# Patient Record
Sex: FEMALE | Race: WHITE | NOT HISPANIC OR LATINO | Employment: OTHER | URBAN - METROPOLITAN AREA
[De-identification: names, ages, dates, MRNs, and addresses within clinical notes are randomized per-mention and may not be internally consistent; named-entity substitution may affect disease eponyms.]

---

## 2017-01-05 DIAGNOSIS — C50.919 MALIGNANT NEOPLASM OF OTHER SPECIFIED SITES OF FEMALE BREAST: Primary | ICD-10-CM

## 2017-01-10 ENCOUNTER — TELEPHONE (OUTPATIENT)
Dept: HEMATOLOGY/ONCOLOGY | Facility: CLINIC | Age: 67
End: 2017-01-10

## 2017-01-10 NOTE — TELEPHONE ENCOUNTER
Received call from patient's . Scheduled labs at [facility] for 1/12/17 and follow up with Dr. Bangura on 1/19/17 at 9:20 am.  voiced understanding and appreciation.

## 2017-01-11 ENCOUNTER — PATIENT MESSAGE (OUTPATIENT)
Dept: NEUROSURGERY | Facility: CLINIC | Age: 67
End: 2017-01-11

## 2017-01-11 ENCOUNTER — HOSPITAL ENCOUNTER (OUTPATIENT)
Dept: RADIOLOGY | Facility: HOSPITAL | Age: 67
Discharge: HOME | End: 2017-01-11
Attending: NURSE PRACTITIONER
Payer: MEDICARE

## 2017-01-11 ENCOUNTER — OFFICE VISIT (OUTPATIENT)
Dept: NEUROSURGERY | Facility: CLINIC | Age: 67
End: 2017-01-11
Payer: MEDICARE

## 2017-01-11 ENCOUNTER — TELEPHONE (OUTPATIENT)
Dept: RADIOLOGY | Facility: HOSPITAL | Age: 67
End: 2017-01-11

## 2017-01-11 VITALS
TEMPERATURE: 98 F | WEIGHT: 219 LBS | DIASTOLIC BLOOD PRESSURE: 95 MMHG | BODY MASS INDEX: 36.49 KG/M2 | HEIGHT: 65 IN | SYSTOLIC BLOOD PRESSURE: 133 MMHG | HEART RATE: 99 BPM

## 2017-01-11 DIAGNOSIS — D49.6 BRAINSTEM TUMOR: ICD-10-CM

## 2017-01-11 DIAGNOSIS — Z98.890 STATUS POST CRANIOTOMY: ICD-10-CM

## 2017-01-11 DIAGNOSIS — C50.011 MALIGNANT NEOPLASM OF NIPPLE OF RIGHT BREAST IN FEMALE: ICD-10-CM

## 2017-01-11 DIAGNOSIS — C79.31 BRAIN METASTASES: Primary | ICD-10-CM

## 2017-01-11 DIAGNOSIS — Z98.890 S/P CRANIOTOMY: ICD-10-CM

## 2017-01-11 DIAGNOSIS — Z98.2 S/P VP SHUNT: ICD-10-CM

## 2017-01-11 PROCEDURE — 70553 MRI BRAIN STEM W/O & W/DYE: CPT | Mod: 26,,, | Performed by: RADIOLOGY

## 2017-01-11 PROCEDURE — 70553 MRI BRAIN STEM W/O & W/DYE: CPT | Mod: TC,PO

## 2017-01-11 PROCEDURE — 25500020 PHARM REV CODE 255: Mod: PO | Performed by: NURSE PRACTITIONER

## 2017-01-11 PROCEDURE — 99024 POSTOP FOLLOW-UP VISIT: CPT | Mod: S$GLB,,, | Performed by: NEUROLOGICAL SURGERY

## 2017-01-11 PROCEDURE — A9585 GADOBUTROL INJECTION: HCPCS | Mod: PO | Performed by: NURSE PRACTITIONER

## 2017-01-11 RX ORDER — GADOBUTROL 604.72 MG/ML
10 INJECTION INTRAVENOUS
Status: COMPLETED | OUTPATIENT
Start: 2017-01-11 | End: 2017-01-11

## 2017-01-11 RX ADMIN — GADOBUTROL 10 ML: 604.72 INJECTION INTRAVENOUS at 02:01

## 2017-01-11 NOTE — PROGRESS NOTES
Neurosurgery Outpatient Follow Up    Patient ID: Rosaura David is a 66 y.o. female.    Chief Complaint   Patient presents with    Post-op Evaluation     S/P  Shunt 11/26/16; S/P Suboccipital Craniectomy Infratentorial Supracerebellar Resection of Brain Stem Tumor 11/29/16 - all at [facility]           Review of Systems   Constitutional: Positive for fatigue. Negative for activity change, appetite change, chills, fever and unexpected weight change.   HENT: Negative for tinnitus, trouble swallowing and voice change.    Eyes: Positive for visual disturbance.   Respiratory: Negative for apnea, cough, chest tightness and shortness of breath.    Cardiovascular: Negative for chest pain and palpitations.   Gastrointestinal: Negative for constipation, diarrhea, nausea and vomiting.   Genitourinary: Negative for difficulty urinating, dysuria, frequency and urgency.   Musculoskeletal: Negative for back pain, gait problem, neck pain and neck stiffness.   Skin: Negative for wound.   Neurological: Negative for dizziness, tremors, seizures, facial asymmetry, speech difficulty, weakness, light-headedness, numbness and headaches.   Psychiatric/Behavioral: Negative for confusion and decreased concentration.       Past Medical History   Diagnosis Date    Arthritis     Breast lesion      LEFT    Cancer      breast    HTN (hypertension)     Murmur     Osteopenia     Presence of dental bridge      LOWER    Seasonal allergies     Thyroid disease      Hypothyroidism    Wears glasses      Social History     Social History    Marital status:      Spouse name: N/A    Number of children: N/A    Years of education: N/A     Occupational History    Not on file.     Social History Main Topics    Smoking status: Never Smoker    Smokeless tobacco: Never Used    Alcohol use No    Drug use: No    Sexual activity: Yes     Partners: Male     Birth control/ protection: Post-menopausal, Surgical     Other Topics Concern    Not on  "file     Social History Narrative     Family History   Problem Relation Age of Onset    Congenital heart disease Mother     Heart disease Mother     Colon cancer Father     Breast cancer Sister     Diabetes       grandfather     Review of patient's allergies indicates:   Allergen Reactions    Sulfa (sulfonamide antibiotics) Other (See Comments)     Tongue swelling    Adhesive Other (See Comments)     Errythema. Only with surgical tape       Current Outpatient Prescriptions:     alprazolam (XANAX) 1 MG tablet, Take 1 tablet (1 mg total) by mouth nightly as needed for Insomnia., Disp: 20 tablet, Rfl: 0    amlodipine-benazepril 5-20 mg (LOTREL) 5-20 mg per capsule, TAKE 1 CAPSULE EVERY DAY, Disp: 90 capsule, Rfl: 3    azelastine (ASTELIN) 137 mcg nasal spray, 1 spray (137 mcg total) by Nasal route 2 (two) times daily as needed., Disp: 30 mL, Rfl: 3    BIOTIN ORAL, Take 1 tablet by mouth once daily., Disp: , Rfl:     calcium-vitamin D 500-125 mg-unit tablet, Take 1 tablet by mouth 2 (two) times daily. , Disp: , Rfl:     ERGOCALCIFEROL, VITAMIN D2, (VITAMIN D ORAL), Take 1,000 Units by mouth. , Disp: , Rfl:     fexofenadine (ALLEGRA) 30 MG tablet, Take 30 mg by mouth 2 (two) times daily., Disp: , Rfl:     hydrochlorothiazide (HYDRODIURIL) 25 MG tablet, Take 1 tablet (25 mg total) by mouth once daily., Disp: 90 tablet, Rfl: 1    lidocaine-prilocaine (EMLA) cream, Apply to affected area once, Disp: 5 g, Rfl: 0    thyroid, pork, (WP THYROID) 97.5 mg Tab, Take 97.5 mg by mouth once daily., Disp: 90 tablet, Rfl: 3  No current facility-administered medications for this visit.   Blood pressure (!) 133/95, pulse 99, temperature 98.2 °F (36.8 °C), temperature source Oral, height 5' 5" (1.651 m), weight 99.3 kg (219 lb).      Neurologic Exam     Mental Status   Oriented to person, place, and time.   Follows 3 step commands.   Attention: normal. Concentration: normal.   Speech: speech is normal   Level of " consciousness: alert  Knowledge: consistent with education.   Able to name object. Able to read. Able to repeat. Able to write. Normal comprehension.     Cranial Nerves     CN II   Visual acuity: normal  Right visual field deficit: none  Left visual field deficit: none     CN III, IV, VI   Pupils are equal, round, and reactive to light.  Extraocular motions are normal.   Right pupil: Size: 3 mm. Shape: regular. Reactivity: brisk. Consensual response: intact. Accommodation: intact.   Left pupil: Size: 3 mm. Shape: regular. Reactivity: brisk. Consensual response: intact. Accommodation: intact.   CN III: bilateral CN III palsy  Nystagmus: none   Diplopia: bilateral, horizontal and vertical  Ophthalmoparesis: left adduction  Upgaze: abnormal  Downgaze: abnormal  Conjugate gaze: absent    CN V   Right facial sensation deficit: none  Left facial sensation deficit: none    CN VII   Right facial weakness: none  Left facial weakness: none    CN VIII   Hearing: intact    CN IX, X   CN IX normal.   CN X normal.     CN XI   Right sternocleidomastoid strength: normal  Left sternocleidomastoid strength: normal  Right trapezius strength: normal  Left trapezius strength: normal    CN XII   Fasciculations: absent  Tongue deviation: none       Internuclear ophthalmoplegia     Motor Exam   Muscle bulk: normal  Overall muscle tone: normal  Right arm pronator drift: absent  Left arm pronator drift: absent       Deconditioning and 4+/5 gen. motor weakness     Sensory Exam   Light touch normal.   Vibration normal.   Pinprick normal.     Gait, Coordination, and Reflexes     Gait  Gait: wide-based    Coordination   Romberg: negative  Finger to nose coordination: normal  Heel to shin coordination: abnormal  Tandem walking coordination: abnormal    Tremor   Resting tremor: absent  Intention tremor: absent  Action tremor: absent    Reflexes   Right brachioradialis: 1+  Left brachioradialis: 1+  Right biceps: 1+  Left biceps: 1+  Right triceps:  1+  Left triceps: 1+  Right patellar: 1+  Left patellar: 1+  Right achilles: 1+  Left achilles: 1+  Right plantar: normal  Left plantar: normal  Right Naranjo: absent  Left Naranjo: absent  Right ankle clonus: absent  Left ankle clonus: absent      Physical Exam   Constitutional: She is oriented to person, place, and time. She appears well-developed and well-nourished.   HENT:   Head: Normocephalic and atraumatic.   Eyes: EOM are normal. Pupils are equal, round, and reactive to light.   Neck: Normal range of motion. Neck supple.   Cardiovascular: Normal rate and intact distal pulses.    Pulmonary/Chest: Effort normal. No respiratory distress.   Abdominal: Soft. She exhibits no distension.   Musculoskeletal: Normal range of motion. She exhibits no edema or deformity.   Neurological: She is oriented to person, place, and time. She has an abnormal Heel to Shin Test and an abnormal Tandem Gait Test. She has a normal Finger-Nose-Finger Test and a normal Romberg Test.   Reflex Scores:       Tricep reflexes are 1+ on the right side and 1+ on the left side.       Bicep reflexes are 1+ on the right side and 1+ on the left side.       Brachioradialis reflexes are 1+ on the right side and 1+ on the left side.       Patellar reflexes are 1+ on the right side and 1+ on the left side.       Achilles reflexes are 1+ on the right side and 1+ on the left side.  Skin: Skin is warm and dry.   Psychiatric: She has a normal mood and affect. Her speech is normal and behavior is normal. Judgment and thought content normal.   Nursing note and vitals reviewed.      Provider dictation:  Mrs. 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  Her primary cancer is breast. Preoperatively she had bilateral ptosis, severe HERI,  visual disturbance and ataxia. She underwent a gross total resection in a very  dangerous area with decompression of the brain stem.     Since discharge she has had improvement in the ptosis and is taping her left eyelid and using her left eye for most visual tasks. Her postoperative MRI demonstrates complete resection of the intracranial tumor with resolution of the surrounding edema. He complains of new severe posterior neck pain of 1 week's duration.    Overall I think she has made a good recovery after a deep brainstem surgery. We will obtain a new contrast enhanced MRI in 3 months time. Any recurrence or de nelida lesions can be treated with stereotactic radiosurgery. Further care including chemo is deferred to the oncology team. The patient and the family are having significant psychological difficulties coping with her diagnosis and  prognosis. We will obtain a cervical MRI now to rule out metastatic spread to the spine. We will call her back to the clinic if there are any positive findings on brain or spine MRI.    We will refer her to Dr Louis to determine if there are oculoplastics treatment options for the ptosis.    Visit Diagnosis:  Brain metastases    Brainstem tumor    Malignant neoplasm of nipple of right breast in female    S/P  shunt    S/P craniotomy

## 2017-01-12 ENCOUNTER — HOSPITAL ENCOUNTER (OUTPATIENT)
Dept: RADIOLOGY | Facility: HOSPITAL | Age: 67
Discharge: HOME | End: 2017-01-12
Attending: INTERNAL MEDICINE
Payer: MEDICARE

## 2017-01-12 DIAGNOSIS — E03.9 HYPOTHYROIDISM: ICD-10-CM

## 2017-01-12 DIAGNOSIS — E04.2 MULTINODULAR GOITER: ICD-10-CM

## 2017-01-12 PROCEDURE — 76536 US EXAM OF HEAD AND NECK: CPT | Mod: TC,PO

## 2017-01-12 PROCEDURE — 76536 US EXAM OF HEAD AND NECK: CPT | Mod: 26,,, | Performed by: RADIOLOGY

## 2017-01-19 ENCOUNTER — OFFICE VISIT (OUTPATIENT)
Dept: ENDOCRINOLOGY | Facility: CLINIC | Age: 67
End: 2017-01-19
Payer: MEDICARE

## 2017-01-19 ENCOUNTER — PATIENT MESSAGE (OUTPATIENT)
Dept: NEUROSURGERY | Facility: CLINIC | Age: 67
End: 2017-01-19

## 2017-01-19 ENCOUNTER — OFFICE VISIT (OUTPATIENT)
Dept: HEMATOLOGY/ONCOLOGY | Facility: CLINIC | Age: 67
End: 2017-01-19
Payer: MEDICARE

## 2017-01-19 VITALS
SYSTOLIC BLOOD PRESSURE: 120 MMHG | HEIGHT: 65 IN | HEART RATE: 78 BPM | BODY MASS INDEX: 35.33 KG/M2 | WEIGHT: 212.06 LBS | DIASTOLIC BLOOD PRESSURE: 84 MMHG

## 2017-01-19 VITALS
BODY MASS INDEX: 35.33 KG/M2 | WEIGHT: 212.06 LBS | RESPIRATION RATE: 19 BRPM | HEART RATE: 86 BPM | DIASTOLIC BLOOD PRESSURE: 78 MMHG | HEIGHT: 65 IN | SYSTOLIC BLOOD PRESSURE: 128 MMHG

## 2017-01-19 DIAGNOSIS — G91.1 OBSTRUCTIVE HYDROCEPHALUS: ICD-10-CM

## 2017-01-19 DIAGNOSIS — C50.011 MALIGNANT NEOPLASM OF NIPPLE OF RIGHT BREAST IN FEMALE: Primary | ICD-10-CM

## 2017-01-19 DIAGNOSIS — E04.2 MULTINODULAR GOITER: Primary | ICD-10-CM

## 2017-01-19 DIAGNOSIS — I10 ESSENTIAL HYPERTENSION: ICD-10-CM

## 2017-01-19 DIAGNOSIS — Z98.2 S/P VP SHUNT: ICD-10-CM

## 2017-01-19 DIAGNOSIS — E03.9 HYPOTHYROIDISM, UNSPECIFIED TYPE: ICD-10-CM

## 2017-01-19 DIAGNOSIS — Z98.890 S/P CRANIOTOMY: ICD-10-CM

## 2017-01-19 DIAGNOSIS — C79.31 BRAIN METASTASES: ICD-10-CM

## 2017-01-19 PROCEDURE — 3079F DIAST BP 80-89 MM HG: CPT | Mod: S$GLB,,, | Performed by: INTERNAL MEDICINE

## 2017-01-19 PROCEDURE — 3078F DIAST BP <80 MM HG: CPT | Mod: S$GLB,,, | Performed by: INTERNAL MEDICINE

## 2017-01-19 PROCEDURE — 1160F RVW MEDS BY RX/DR IN RCRD: CPT | Mod: S$GLB,,, | Performed by: INTERNAL MEDICINE

## 2017-01-19 PROCEDURE — 99214 OFFICE O/P EST MOD 30 MIN: CPT | Mod: S$GLB,,, | Performed by: INTERNAL MEDICINE

## 2017-01-19 PROCEDURE — 99999 PR PBB SHADOW E&M-EST. PATIENT-LVL III: CPT | Mod: PBBFAC,,, | Performed by: INTERNAL MEDICINE

## 2017-01-19 PROCEDURE — 1159F MED LIST DOCD IN RCRD: CPT | Mod: S$GLB,,, | Performed by: INTERNAL MEDICINE

## 2017-01-19 PROCEDURE — 1157F ADVNC CARE PLAN IN RCRD: CPT | Mod: S$GLB,,, | Performed by: INTERNAL MEDICINE

## 2017-01-19 PROCEDURE — 1126F AMNT PAIN NOTED NONE PRSNT: CPT | Mod: S$GLB,,, | Performed by: INTERNAL MEDICINE

## 2017-01-19 PROCEDURE — 3074F SYST BP LT 130 MM HG: CPT | Mod: S$GLB,,, | Performed by: INTERNAL MEDICINE

## 2017-01-19 PROCEDURE — 99999 PR PBB SHADOW E&M-EST. PATIENT-LVL II: CPT | Mod: PBBFAC,,, | Performed by: INTERNAL MEDICINE

## 2017-01-19 RX ORDER — MELOXICAM 7.5 MG/1
TABLET ORAL
Status: ON HOLD | COMMUNITY
Start: 2017-01-16 | End: 2017-06-19 | Stop reason: HOSPADM

## 2017-01-19 NOTE — PROGRESS NOTES
A 66-year-old  woman, who I met during the summer of 2015.  The patient   came in to see me in the Medical Oncology Clinic for neoadjuvant chemotherapy   for high-grade infiltrating right breast ductal carcinoma with hypermetabolic   lymph nodes along supraclavicular and subdural internal mammary and right   axillary lymph nodes, SUV of 17-18 were noted.  She was triple negative at that   time.  The patient received Adriamycin and Cytoxan chemotherapy x4 followed by   Taxotere x4.  She had some side effects of neuropathy and at the completion, she   had marked improvement of the hypermetabolic breast mass that was reduced to   almost scar like area and all the lymph nodes that were lighting up in multiple   areas had disappeared.  The patient then 09/20/2015, underwent four sentinel   lymph node evaluations that were negative for metastases and right breast   partial mastectomy, which showed benign breast tissue with small focus of fat   necrosis from previous biopsy site.  No residual carcinoma was noted.  The   patient was sent for Radiation Oncology evaluation opted not to have any   adjuvant radiation therapy and was undergoing surveillance.  She had a followup   PET scan for surveillance done in October 2015, which showed recurrence with   interval development of extensive hypermetabolic lymphadenopathy within the   right axillary region, right subpectoral region consistent with metastatic   disease.  The largest node in the axilla was 4.8 x 3.7 cm with a maximum SUV of   16.3 and the medial aspect of the right breast also had an irregular   hypermetabolic mass 4.9 x 3 cm with a maximum SUV of 4.6.  No other metastatic   areas were found and after much debate, the patient reembarked on the same   Adriamycin and Cytoxan x4.  At the completion of therapy, the patient showed   complete clearing of all the disease noted in the lymph nodes and in the breast.    She had laser mastectomy under Dr. Atwood  in [place], no residual dz in breast or lymph nodes on path report.   Pt also had the lipoma under the rt arm removed this was in [place].she then underwent adjuvamt xrt with DR. Santiago.   she presented to the ED in 11/2016  with complaints of worsening dizziness, double vision, right-sided facial weakness, and unsteadiness over  2 weeks found to have met to brain with hydrocephalus   S/P  Shunt 11/26/16; S/P Suboccipital Craniectomy Infratentorial Supracerebellar Resection of Brain Stem Tumor 11/29/16 - all at [facility]      Since discharge she has had improvement in the ptosis and is taping her left eyelid and using her left eye for most visual tasks. Her postoperative MRI demonstrates complete resection of the intracranial tumor with resolution of the surrounding edema. He complains of new severe posterior neck pain of 1 week's duration. Referred to see neuro ophal next week , here to get recommendations regarding further rx   ROS: still with residual ptosis, tapes left eye or holds lid up , uses rt eye.   In a wheel chair from some weakness but mostly because of visual issues making her feel she may fall.   good spirits, wants to be careful about hair loss with rx, and is tired of back and forth with rx.   [facility] dc.  Wt Readings from Last 3 Encounters:   01/19/17 96.2 kg (212 lb 1.3 oz)   01/19/17 96.2 kg (212 lb 1.3 oz)   01/11/17 99.3 kg (219 lb)     Temp Readings from Last 3 Encounters:   01/11/17 98.2 °F (36.8 °C) (Oral)   12/14/16 98.1 °F (36.7 °C)   09/06/16 98.1 °F (36.7 °C)     BP Readings from Last 3 Encounters:   01/19/17 120/84   01/19/17 128/78   01/11/17 (!) 133/95     Pulse Readings from Last 3 Encounters:   01/19/17 78   01/19/17 86   01/11/17 99   lungs CTA, cardiac and abd exam benign  Lab Results   Component Value Date    WBC 4.24 01/12/2017    HGB 11.9 (L) 01/12/2017    HCT 35.6 (L) 01/12/2017    MCV 86 01/12/2017     01/12/2017     CMP  Sodium   Date Value Ref  Range Status   01/12/2017 144 136 - 145 mmol/L Final     Potassium   Date Value Ref Range Status   01/12/2017 3.8 3.5 - 5.1 mmol/L Final     Chloride   Date Value Ref Range Status   01/12/2017 104 95 - 110 mmol/L Final     CO2   Date Value Ref Range Status   01/12/2017 30 (H) 23 - 29 mmol/L Final     Glucose   Date Value Ref Range Status   01/12/2017 149 (H) 70 - 110 mg/dL Final     BUN, Bld   Date Value Ref Range Status   01/12/2017 16 8 - 23 mg/dL Final     Creatinine   Date Value Ref Range Status   01/12/2017 0.9 0.5 - 1.4 mg/dL Final     Calcium   Date Value Ref Range Status   01/12/2017 9.4 8.7 - 10.5 mg/dL Final     Total Protein   Date Value Ref Range Status   01/12/2017 6.5 6.0 - 8.4 g/dL Final     Albumin   Date Value Ref Range Status   01/12/2017 3.4 (L) 3.5 - 5.2 g/dL Final     Total Bilirubin   Date Value Ref Range Status   01/12/2017 0.5 0.1 - 1.0 mg/dL Final     Comment:     For infants and newborns, interpretation of results should be based  on gestational age, weight and in agreement with clinical  observations.  Premature Infant recommended reference ranges:  Up to 24 hours.............<8.0 mg/dL  Up to 48 hours............<12.0 mg/dL  3-5 days..................<15.0 mg/dL  6-29 days.................<15.0 mg/dL       Alkaline Phosphatase   Date Value Ref Range Status   01/12/2017 77 55 - 135 U/L Final     AST   Date Value Ref Range Status   01/12/2017 21 10 - 40 U/L Final     ALT   Date Value Ref Range Status   01/12/2017 29 10 - 44 U/L Final     Anion Gap   Date Value Ref Range Status   01/12/2017 10 8 - 16 mmol/L Final     eGFR if    Date Value Ref Range Status   01/12/2017 >60.0 >60 mL/min/1.73 m^2 Final     eGFR if non    Date Value Ref Range Status   01/12/2017 >60.0 >60 mL/min/1.73 m^2 Final     Comment:     Calculation used to obtain the estimated glomerular filtration  rate (eGFR) is the CKD-EPI equation. Since race is unknown   in our information system, the  eGFR values for   -American and Non--American patients are given   for each creatinine result.     MRI done last week , DR. Wong feels no residual dz, I have noted and read radiologist interpretation to the patient as well, which may suggest residual dz vs post op changes.    ct 12/2016  Impression          1.  No definite evidence of intrathoracic or intra-abdominal/pelvic metastatic disease is visualized.  2.  Postsurgical changes in the right axilla and about the right breast are seen.  There is a bandlike area of soft tissue attenuation in the right axillary region extending into the residual right lateral breast with associated skin thickening about the postoperative right breast.  This may be related to posttreatment changes, but residual or recurrent disease cannot be excluded.  Consider correlation with mammography.  Followup is recommended.  3.  Hepatomegaly is seen.  The liver demonstrates mild decreased attenuation which is suggestive of fatty infiltration.         Plan : get ER/ CT and her 2 on brain lesion.   PET scan asap. RTc with above for rx discussion

## 2017-01-19 NOTE — MR AVS SNAPSHOT
[facility]  [address] [facility] [address]  [facility] [address]  Phone: [phone]  Fax: [phone]                  Rosaura David   2017 10:30 AM   Office Visit    Description:  Female : 1950   Provider:  John Hobson DO   Department:  [facility]           Diagnoses this Visit        Comments    Hypothyroidism, unspecified type    -  Primary            To Do List           Future Appointments        Provider Department Dept Phone    2017 2:00 PM [facility] MRI1 [facility] [phone]    2017 2:30 PM Caron Rahman, NP [facility] Neurosurgery [phone]    2017 10:30 AM CHAIR April [facility] CHEMO [facility] [phone]    2017 8:15 AM [facility] PET CT1 LIMIT 400 LBS [facility] [phone]    2017 9:40 AM Leanna Bangura MD [facility] H[facility]ology [phone]      Goals (5 Years of Data)     None       These Medications        Disp Refills Start End    thyroid, pork, (WP THYROID) 81.25 mg Tab 30 tablet 3 2017     Take 81.25 mg by mouth once daily. - Oral    Pharmacy: [facility] [address] [place] [address] Ph #: [phone]         [facility]     [facility] Nurse Care Line -  Assistance  Registered nurses in the [facility] provide clinical advisement, health education, appointment booking, and other advisory services.  Call for this free service at [phone].             Medications           Message regarding Medications     Verify the changes and/or additions to your medication regime listed below are the same as discussed with your clinician today.  If any of these changes or additions are incorrect, please notify your healthcare provider.        START taking these NEW medications        Refills    thyroid, pork, (WP THYROID) 81.25 mg Tab 3    Sig: Take 81.25 mg by mouth once daily.    Class: Normal    Route: Oral           Verify that  "the below list of medications is an accurate representation of the medications you are currently taking.  If none reported, the list may be blank. If incorrect, please contact your healthcare provider. Carry this list with you in case of emergency.           Current Medications     alprazolam (XANAX) 1 MG tablet Take 1 tablet (1 mg total) by mouth nightly as needed for Insomnia.    amlodipine-benazepril 5-20 mg (LOTREL) 5-20 mg per capsule TAKE 1 CAPSULE EVERY DAY    azelastine (ASTELIN) 137 mcg nasal spray 1 spray (137 mcg total) by Nasal route 2 (two) times daily as needed.    calcium-vitamin D 500-125 mg-unit tablet Take 1 tablet by mouth 2 (two) times daily.     fexofenadine (ALLEGRA) 30 MG tablet Take 30 mg by mouth as needed.     hydrochlorothiazide (HYDRODIURIL) 25 MG tablet Take 1 tablet (25 mg total) by mouth once daily.    lidocaine-prilocaine (EMLA) cream Apply to affected area once    meloxicam (MOBIC) 7.5 MG tablet     thyroid, pork, (WP THYROID) 81.25 mg Tab Take 81.25 mg by mouth once daily.           Clinical Reference Information           Vital Signs - Last Recorded  Most recent update: 1/19/2017 10:42 AM by Lisset Jennings LPN    BP Pulse Ht Wt BMI    120/84 (BP Method: Manual) 78 5' 5" (1.651 m) 96.2 kg (212 lb 1.3 oz) 35.29 kg/m2      Blood Pressure          Most Recent Value    BP  120/84      Allergies as of 1/19/2017     Sulfa (Sulfonamide Antibiotics)    Adhesive      Immunizations Administered on Date of Encounter - 1/19/2017     None      Orders Placed During Today's Visit     Recurring Lab Work Interval Expires    T3, free   3/20/2018    T4, free   3/20/2018    TSH   3/20/2018      "

## 2017-01-19 NOTE — PROGRESS NOTES
CHIEF COMPLAINT: Thyroid nodules/Hypothyroid  66 year old female here for f/u. S/P benign Left FNA 4/10, 2011, 2012. Was on WP Thyroid 97.5 mg. States that has had surgery for brain stem tumor. Will be starting chemo after PET. No difficulty swallowing. No change in voice. No palpitations. No tremors.             PAST MEDICAL HISTORY: Osteopenia. GERD, hypertension    PAST SURGICAL HISTORY: BLAINE/BSO    SOCIAL HISTORY: She does not smoke or drink. Teaches Estonian    FAMILY HISTORY: Hypothyroidism, Breast cancer.     MEDICATIONS/ALLERGIES: The patient's MedCard has been updated and reviewed.     ROS:   Constitutional: No recent significant weight change  Eyes: No recent visual changes  ENT: No dysphagia  Cardiovascular: Denies current anginal symptoms  Respiratory: Denies current respiratory difficulty  Gastrointestinal: Denies recent bowel disturbances  GenitoUrinary - No dysuria  Skin: No new skin rash  Neurologic: No focal neurologic complaints  Remainder ROS negative        PE:  GENERAL: Well developed, well nourished  EYES: Anicteric, symmetrical pupils  NECK: Supple neck, large left nodule palpable  LYMPHATIC: No cervical or supraclavicular lymphadenopathy  CARDIOVASCULAR: Normal heart sounds, no pedal edema  RESPIRATORY: Normal effort, clear to auscultation    Results for MARIAMA SCHWARTZ (MRN [id number]) as of 1/19/2017 10:47   Ref. Range 1/12/2017 10:10   TSH Latest Ref Range: 0.400 - 4.000 uIU/mL 0.167 (L)   T3, Free Latest Ref Range: 2.3 - 4.2 pg/mL 3.7   Free T4 Latest Ref Range: 0.71 - 1.51 ng/dL 1.07       THYROID US:   Findings: The right lobe measures 3.6 x 1.3 x 0.9cm and contains 5nodules.  The isthmus is 3 mm in AP diameter.  The left lobe measured 6.1 x 2.2 x 1.7 cm and contained 3 nodules.  The largest nodule on the right was 4 x 3 x 3 mm in the lower pole.  The largest nodule in the left lobe measured 3.2 x 2.1 x 3.1 cm and contains calcificationmm lower pole in location.       Impression        Multinodular goiter.  No significant change from previous in August of 2016. Stable dominant calcified nodule in the lower pole of the left lobe which has not significantly changed compared to previous ultrasounds dating back to 2014.               ASSESSMENT/PLAN:  1. Multinodular goiter-dominant nodule of left lobe.  PET scan does not show increased activity in the thyroid, however does not r/o malignancy in the thyroid. US shows no change from before    2. Osteopenia- no fractures. Taking Ca + D. Discussed risks of a suppressed TSH on bone density.    3. Hypothyroid- TSH suppressed. Asymptomatic. Decrease WP Thyroid to 81 mg. Discussed risks of a suppressed TSH      FOLLOWUP  2 months- TSH, Ft4, Ft3  F/U 6 months with TSH, Ft4, Ft3

## 2017-01-20 ENCOUNTER — TELEPHONE (OUTPATIENT)
Dept: HEMATOLOGY/ONCOLOGY | Facility: CLINIC | Age: 67
End: 2017-01-20

## 2017-01-20 ENCOUNTER — CLINICAL SUPPORT (OUTPATIENT)
Dept: NEUROSURGERY | Facility: CLINIC | Age: 67
End: 2017-01-20
Payer: MEDICARE

## 2017-01-20 ENCOUNTER — HOSPITAL ENCOUNTER (OUTPATIENT)
Dept: RADIOLOGY | Facility: HOSPITAL | Age: 67
Discharge: HOME | End: 2017-01-20
Attending: NEUROLOGICAL SURGERY
Payer: MEDICARE

## 2017-01-20 DIAGNOSIS — C79.31 BRAIN METASTASES: ICD-10-CM

## 2017-01-20 DIAGNOSIS — G93.89 BRAIN MASS: Primary | ICD-10-CM

## 2017-01-20 DIAGNOSIS — D49.6 BRAINSTEM TUMOR: ICD-10-CM

## 2017-01-20 DIAGNOSIS — Z98.2 S/P VP SHUNT: Primary | ICD-10-CM

## 2017-01-20 PROCEDURE — 25500020 PHARM REV CODE 255: Mod: PO | Performed by: NEUROLOGICAL SURGERY

## 2017-01-20 PROCEDURE — 72156 MRI NECK SPINE W/O & W/DYE: CPT | Mod: TC,PO

## 2017-01-20 PROCEDURE — 72156 MRI NECK SPINE W/O & W/DYE: CPT | Mod: 26,,, | Performed by: RADIOLOGY

## 2017-01-20 PROCEDURE — A9585 GADOBUTROL INJECTION: HCPCS | Mod: PO | Performed by: NEUROLOGICAL SURGERY

## 2017-01-20 RX ORDER — GADOBUTROL 604.72 MG/ML
10 INJECTION INTRAVENOUS
Status: COMPLETED | OUTPATIENT
Start: 2017-01-20 | End: 2017-01-20

## 2017-01-20 RX ADMIN — GADOBUTROL 10 ML: 604.72 INJECTION INTRAVENOUS at 12:01

## 2017-01-20 NOTE — TELEPHONE ENCOUNTER
Placed order for additional testing for er/pr and her 2 per dr. Bangura's request. Faxed over request for additional to hayden at [facility]. Faxed over a request for blocks to rosa at [facility]. Both faxes were confirmed and received. Notified both rosa and hayden of faxes and request.

## 2017-01-20 NOTE — MR AVS SNAPSHOT
[facility]  [address]  [facility] [address]  Phone: [phone]  Fax: [phone]                  Rosaura David   2017 2:30 PM   Clinical Support    Description:  Female : 1950   Provider:  [facility]   Department:  [place] - Neurosurgery           Diagnoses this Visit        Comments    S/P  shunt    -  Primary            To Do List           Future Appointments        Provider Department Dept Phone    2017 8:15 AM [facility] PET CT1 LIMIT 400 LBS [facility] [phone]    2017 9:00 AM CHAIR 19, [facility] CHEMO [facility] [phone]    2017 9:40 AM Leanna Bangura MD [facility] Hematology [phone]    2017 1:00 PM Brock Louis MD [facility] [phone]    3/6/2017 1:00 PM CHAIR 08, [facility] CHEMO [facility] [phone]      Goals (5 Years of Data)     None      [facility]     [facility] Nurse Care [facility] -  Assistance  Registered nurses in the [facility] provide clinical advisement, health education, appointment booking, and other advisory services.  Call for this free service at [phone].             Medications           Message regarding Medications     Verify the changes and/or additions to your medication regime listed below are the same as discussed with your clinician today.  If any of these changes or additions are incorrect, please notify your healthcare provider.             Verify that the below list of medications is an accurate representation of the medications you are currently taking.  If none reported, the list may be blank. If incorrect, please contact your healthcare provider. Carry this list with you in case of emergency.           Current Medications     alprazolam (XANAX) 1 MG tablet Take 1 tablet (1 mg total) by mouth nightly as needed for Insomnia.    amlodipine-benazepril 5-20 mg (LOTREL) 5-20 mg per  capsule TAKE 1 CAPSULE EVERY DAY    azelastine (ASTELIN) 137 mcg nasal spray 1 spray (137 mcg total) by Nasal route 2 (two) times daily as needed.    calcium-vitamin D 500-125 mg-unit tablet Take 1 tablet by mouth 2 (two) times daily.     fexofenadine (ALLEGRA) 30 MG tablet Take 30 mg by mouth as needed.     hydrochlorothiazide (HYDRODIURIL) 25 MG tablet Take 1 tablet (25 mg total) by mouth once daily.    lidocaine-prilocaine (EMLA) cream Apply to affected area once    meloxicam (MOBIC) 7.5 MG tablet     thyroid, pork, (WP THYROID) 81.25 mg Tab Take 81.25 mg by mouth once daily.           Clinical Reference Information           Allergies as of 1/20/2017     Sulfa (Sulfonamide Antibiotics)    Adhesive      Immunizations Administered on Date of Encounter - 1/20/2017     None

## 2017-01-20 NOTE — PROGRESS NOTES
Patient had MRI earlier today.  shunt setting assessed using Medtronic shunt . Valve incorrectly set in between 1.0 and 0.5.    Using , valve correctly set to 1.5 P/L. Patient tolerated well.

## 2017-01-22 ENCOUNTER — PATIENT MESSAGE (OUTPATIENT)
Dept: HEMATOLOGY/ONCOLOGY | Facility: CLINIC | Age: 67
End: 2017-01-22

## 2017-01-23 ENCOUNTER — PATIENT MESSAGE (OUTPATIENT)
Dept: HEMATOLOGY/ONCOLOGY | Facility: CLINIC | Age: 67
End: 2017-01-23

## 2017-01-24 ENCOUNTER — HOSPITAL ENCOUNTER (OUTPATIENT)
Dept: RADIOLOGY | Facility: HOSPITAL | Age: 67
Discharge: HOME | End: 2017-01-24
Attending: INTERNAL MEDICINE
Payer: MEDICARE

## 2017-01-24 PROCEDURE — A9552 F18 FDG: HCPCS | Mod: PO

## 2017-01-24 PROCEDURE — 78815 PET IMAGE W/CT SKULL-THIGH: CPT | Mod: 26,PS,, | Performed by: RADIOLOGY

## 2017-01-24 PROCEDURE — 88399 UNLISTED SURGICAL PATH PX: CPT | Performed by: PATHOLOGY

## 2017-01-24 PROCEDURE — 88323 CONSLTJ&REPRT MATRL PREP SLD: CPT | Mod: 26,,, | Performed by: PATHOLOGY

## 2017-01-24 PROCEDURE — 88360 TUMOR IMMUNOHISTOCHEM/MANUAL: CPT | Mod: 26,59,, | Performed by: PATHOLOGY

## 2017-01-26 ENCOUNTER — PATIENT MESSAGE (OUTPATIENT)
Dept: NEUROSURGERY | Facility: CLINIC | Age: 67
End: 2017-01-26

## 2017-01-26 ENCOUNTER — INFUSION (OUTPATIENT)
Dept: INFUSION THERAPY | Facility: HOSPITAL | Age: 67
End: 2017-01-26
Attending: INTERNAL MEDICINE
Payer: MEDICARE

## 2017-01-26 ENCOUNTER — OFFICE VISIT (OUTPATIENT)
Dept: HEMATOLOGY/ONCOLOGY | Facility: CLINIC | Age: 67
End: 2017-01-26
Payer: MEDICARE

## 2017-01-26 VITALS
SYSTOLIC BLOOD PRESSURE: 109 MMHG | DIASTOLIC BLOOD PRESSURE: 74 MMHG | BODY MASS INDEX: 35.52 KG/M2 | HEIGHT: 65 IN | RESPIRATION RATE: 20 BRPM | HEART RATE: 91 BPM | WEIGHT: 213.19 LBS

## 2017-01-26 DIAGNOSIS — C50.011 MALIGNANT NEOPLASM OF NIPPLE OF RIGHT BREAST IN FEMALE: Primary | ICD-10-CM

## 2017-01-26 DIAGNOSIS — Z98.890 S/P CRANIOTOMY: ICD-10-CM

## 2017-01-26 DIAGNOSIS — G91.1 OBSTRUCTIVE HYDROCEPHALUS: Primary | ICD-10-CM

## 2017-01-26 DIAGNOSIS — C79.31 BRAIN METASTASES: ICD-10-CM

## 2017-01-26 DIAGNOSIS — I10 ESSENTIAL HYPERTENSION: ICD-10-CM

## 2017-01-26 DIAGNOSIS — Z98.2 S/P VP SHUNT: ICD-10-CM

## 2017-01-26 PROCEDURE — 1126F AMNT PAIN NOTED NONE PRSNT: CPT | Mod: S$GLB,,, | Performed by: INTERNAL MEDICINE

## 2017-01-26 PROCEDURE — 96523 IRRIG DRUG DELIVERY DEVICE: CPT | Mod: PN

## 2017-01-26 PROCEDURE — 99214 OFFICE O/P EST MOD 30 MIN: CPT | Mod: S$GLB,,, | Performed by: INTERNAL MEDICINE

## 2017-01-26 PROCEDURE — 25000003 PHARM REV CODE 250: Mod: PN | Performed by: INTERNAL MEDICINE

## 2017-01-26 PROCEDURE — 1157F ADVNC CARE PLAN IN RCRD: CPT | Mod: S$GLB,,, | Performed by: INTERNAL MEDICINE

## 2017-01-26 PROCEDURE — 3078F DIAST BP <80 MM HG: CPT | Mod: S$GLB,,, | Performed by: INTERNAL MEDICINE

## 2017-01-26 PROCEDURE — 1160F RVW MEDS BY RX/DR IN RCRD: CPT | Mod: S$GLB,,, | Performed by: INTERNAL MEDICINE

## 2017-01-26 PROCEDURE — 1159F MED LIST DOCD IN RCRD: CPT | Mod: S$GLB,,, | Performed by: INTERNAL MEDICINE

## 2017-01-26 PROCEDURE — 99999 PR PBB SHADOW E&M-EST. PATIENT-LVL III: CPT | Mod: PBBFAC,,, | Performed by: INTERNAL MEDICINE

## 2017-01-26 PROCEDURE — 3074F SYST BP LT 130 MM HG: CPT | Mod: S$GLB,,, | Performed by: INTERNAL MEDICINE

## 2017-01-26 RX ORDER — HEPARIN 100 UNIT/ML
500 SYRINGE INTRAVENOUS
Status: COMPLETED | OUTPATIENT
Start: 2017-01-26 | End: 2017-01-26

## 2017-01-26 RX ORDER — SODIUM CHLORIDE 0.9 % (FLUSH) 0.9 %
10 SYRINGE (ML) INJECTION
Status: COMPLETED | OUTPATIENT
Start: 2017-01-26 | End: 2017-01-26

## 2017-01-26 RX ADMIN — HEPARIN 500 UNITS: 100 SYRINGE at 09:01

## 2017-01-26 RX ADMIN — SODIUM CHLORIDE, PRESERVATIVE FREE 10 ML: 5 INJECTION INTRAVENOUS at 09:01

## 2017-01-27 NOTE — PROGRESS NOTES
Subjective:       Patient ID: Rosaura David is a 66 y.o. female.    Chief Complaint:met breast ca, s/p metastatectomy.   HPI:   A 66-year-old  woman, who I met during the summer of 2015. The patient  came in to see me in the Medical Oncology Clinic for neoadjuvant chemotherapy   for high-grade infiltrating right breast ductal carcinoma with hypermetabolic   lymph nodes along supraclavicular and subdural internal mammary and right   axillary lymph nodes, SUV of 17-18 were noted. She was triple negative at that   time. The patient received Adriamycin and Cytoxan chemotherapy x4 followed by   Taxotere x4. She had some side effects of neuropathy and at the completion, she  had marked improvement of the hypermetabolic breast mass that was reduced to   almost scar like area and all the lymph nodes that were lighting up in multiple   areas had disappeared. The patient then 09/20/2015, underwent four sentinel   lymph node evaluations that were negative for metastases and right breast   partial mastectomy, which showed benign breast tissue with small focus of fat   necrosis from previous biopsy site. No residual carcinoma was noted. The   patient was sent for Radiation Oncology evaluation opted not to have any   adjuvant radiation therapy and was undergoing surveillance. She had a followup   PET scan for surveillance done in October 2015, which showed recurrence with   interval development of extensive hypermetabolic lymphadenopathy within the   right axillary region, right subpectoral region consistent with metastatic   disease. The largest node in the axilla was 4.8 x 3.7 cm with a maximum SUV of   16.3 and the medial aspect of the right breast also had an irregular   hypermetabolic mass 4.9 x 3 cm with a maximum SUV of 4.6. No other metastatic   areas were found and after much debate, the patient reembarked on the same   Adriamycin and Cytoxan x4. At the completion of therapy, the patient showed   complete  clearing of all the disease noted in the lymph nodes and in the breast.  She had laser mastectomy under Dr. Atwood in [place], no residual dz in breast or lymph nodes on path report.   Pt also had the lipoma under the rt arm removed this was in [place].she then underwent adjuvamt xrt with DR. Santiago.  she presented to the ED in 11/2016  with complaints of worsening dizziness, double vision, right-sided facial weakness, and unsteadiness over 2 weeks found to have met to brain with hydrocephalus   S/P  Shunt 11/26/16; S/P Suboccipital Craniectomy Infratentorial Supracerebellar Resection of Brain Stem Tumor 11/29/16 - all at [facility] [place]     pt has neuroophthal appt for the ptosis.   also has a new lesion that DR. Wong would do stereotactic sx for next week at [facility]. ER/[facility]/ pending for brain mass    Laboratory:     CBC:  Lab Results   Component Value Date    WBC 4.24 01/12/2017    RBC 4.12 01/12/2017    HGB 11.9 (L) 01/12/2017    HCT 35.6 (L) 01/12/2017    MCV 86 01/12/2017    MCH 28.9 01/12/2017    MCHC 33.4 01/12/2017    RDW 14.7 (H) 01/12/2017     01/12/2017    MPV 9.6 01/12/2017    GRAN 2.5 01/12/2017    GRAN 58.5 01/12/2017    LYMPH 1.0 01/12/2017    LYMPH 23.8 01/12/2017    MONO 0.4 01/12/2017    MONO 10.1 01/12/2017    EOS 0.3 01/12/2017    BASO 0.02 01/12/2017    EOSINOPHIL 7.1 01/12/2017    BASOPHIL 0.5 01/12/2017       BMP: BMP  Lab Results   Component Value Date     01/12/2017    K 3.8 01/12/2017     01/12/2017    CO2 30 (H) 01/12/2017    BUN 16 01/12/2017    CREATININE 0.9 01/12/2017    CALCIUM 9.4 01/12/2017    ANIONGAP 10 01/12/2017    ESTGFRAFRICA >60.0 01/12/2017    EGFRNONAA >60.0 01/12/2017       LFT:   Lab Results   Component Value Date    ALT 29 01/12/2017    AST 21 01/12/2017    ALKPHOS 77 01/12/2017    BILITOT 0.5 01/12/2017   PET 1/2017 no mets anywhere else      Assessment/Plan:       1. Obstructive hydrocephalus    2. Brain metastases    3. Essential hypertension    4. S/P  shunt     5. S/P craniotomy        Keep DR. Wong schedule for SBRT  Get ER/Pr of brain sp.   rtc after 3 weeks

## 2017-01-31 ENCOUNTER — LAB VISIT (OUTPATIENT)
Dept: LAB | Facility: HOSPITAL | Age: 67
End: 2017-01-31
Attending: NEUROLOGICAL SURGERY
Payer: MEDICARE

## 2017-01-31 DIAGNOSIS — Z01.812 PRE-PROCEDURE LAB EXAM: ICD-10-CM

## 2017-01-31 DIAGNOSIS — Z01.812 PRE-PROCEDURE LAB EXAM: Primary | ICD-10-CM

## 2017-01-31 LAB
ANION GAP SERPL CALC-SCNC: 11 MMOL/L
BUN SERPL-MCNC: 15 MG/DL
CALCIUM SERPL-MCNC: 10.2 MG/DL
CHLORIDE SERPL-SCNC: 104 MMOL/L
CO2 SERPL-SCNC: 28 MMOL/L
CREAT SERPL-MCNC: 0.9 MG/DL
EST. GFR  (AFRICAN AMERICAN): >60 ML/MIN/1.73 M^2
EST. GFR  (NON AFRICAN AMERICAN): >60 ML/MIN/1.73 M^2
GLUCOSE SERPL-MCNC: 110 MG/DL
POTASSIUM SERPL-SCNC: 3.9 MMOL/L
SODIUM SERPL-SCNC: 143 MMOL/L

## 2017-01-31 PROCEDURE — 80048 BASIC METABOLIC PNL TOTAL CA: CPT

## 2017-01-31 PROCEDURE — 36415 COLL VENOUS BLD VENIPUNCTURE: CPT | Mod: PO

## 2017-02-02 ENCOUNTER — INITIAL CONSULT (OUTPATIENT)
Dept: OPHTHALMOLOGY | Facility: CLINIC | Age: 67
End: 2017-02-02
Payer: MEDICARE

## 2017-02-02 DIAGNOSIS — C79.31 BRAIN METASTASES: Primary | ICD-10-CM

## 2017-02-02 DIAGNOSIS — C50.911: ICD-10-CM

## 2017-02-02 DIAGNOSIS — H51.0 PARINAUD'S SYNDROME AFFECTING BOTH EYES: ICD-10-CM

## 2017-02-02 DIAGNOSIS — C79.31: ICD-10-CM

## 2017-02-02 DIAGNOSIS — G91.1 OBSTRUCTIVE HYDROCEPHALUS: ICD-10-CM

## 2017-02-02 PROBLEM — C50.919: Status: ACTIVE | Noted: 2017-02-02

## 2017-02-02 PROCEDURE — 1157F ADVNC CARE PLAN IN RCRD: CPT | Mod: S$GLB,,, | Performed by: OPHTHALMOLOGY

## 2017-02-02 PROCEDURE — 1126F AMNT PAIN NOTED NONE PRSNT: CPT | Mod: S$GLB,,, | Performed by: OPHTHALMOLOGY

## 2017-02-02 PROCEDURE — 99999 PR PBB SHADOW E&M-EST. PATIENT-LVL II: CPT | Mod: PBBFAC,,, | Performed by: OPHTHALMOLOGY

## 2017-02-02 PROCEDURE — 92083 EXTENDED VISUAL FIELD XM: CPT | Mod: S$GLB,,, | Performed by: OPHTHALMOLOGY

## 2017-02-02 PROCEDURE — 99204 OFFICE O/P NEW MOD 45 MIN: CPT | Mod: S$GLB,,, | Performed by: OPHTHALMOLOGY

## 2017-02-02 PROCEDURE — 1159F MED LIST DOCD IN RCRD: CPT | Mod: S$GLB,,, | Performed by: OPHTHALMOLOGY

## 2017-02-02 PROCEDURE — 1160F RVW MEDS BY RX/DR IN RCRD: CPT | Mod: S$GLB,,, | Performed by: OPHTHALMOLOGY

## 2017-02-02 NOTE — MR AVS SNAPSHOT
[place] - Ophthalmology  [address] [place] [address]  Phone: [phone]  Fax: [phone]                  Rosaura David   2017 1:00 PM   Initial consult    Description:  Female : 1950   Provider:  Brock Louis MD   Department:  [place] - Ophthalmology           Reason for Visit     Eye Problem           Diagnoses this Visit        Comments    Brain metastases    -  Primary     Obstructive hydrocephalus         Carcinoma of breast metastatic to brain, right         Parinaud's syndrome affecting both eyes                To Do List           Future Appointments        Provider Department Dept Phone    3/6/2017 1:00 PM CHAIR 08, [facility] CHEMO [facility] [phone]    3/20/2017 10:40 AM LABORATORY, [place] [facility] [phone]    2017 9:00 AM [facility] MRI1 [facility] [phone]    2017 11:00 AM Caron Rahman NP [facility] [phone]    2017 10:00 AM LABORATORY, [place] [facility] [phone]      Goals (5 Years of Data)     None      Follow-Up and Disposition     Return in about 2 months (around 2017).      [facility]     [facility] Nurse Care Line - 24/7 Assistance  Registered nurses in the [facility] provide clinical advisement, health education, appointment booking, and other advisory services.  Call for this free service at [phone].             Medications           Message regarding Medications     Verify the changes and/or additions to your medication regime listed below are the same as discussed with your clinician today.  If any of these changes or additions are incorrect, please notify your healthcare provider.             Verify that the below list of medications is an accurate representation of the medications you are currently taking.  If none reported, the list may be blank. If incorrect, please contact your  healthcare provider. Carry this list with you in case of emergency.           Current Medications     alprazolam (XANAX) 1 MG tablet Take 1 tablet (1 mg total) by mouth nightly as needed for Insomnia.    amlodipine-benazepril 5-20 mg (LOTREL) 5-20 mg per capsule TAKE 1 CAPSULE EVERY DAY    azelastine (ASTELIN) 137 mcg nasal spray 1 spray (137 mcg total) by Nasal route 2 (two) times daily as needed.    calcium-vitamin D 500-125 mg-unit tablet Take 1 tablet by mouth 2 (two) times daily.     fexofenadine (ALLEGRA) 30 MG tablet Take 30 mg by mouth as needed.     hydrochlorothiazide (HYDRODIURIL) 25 MG tablet Take 1 tablet (25 mg total) by mouth once daily.    lidocaine-prilocaine (EMLA) cream Apply to affected area once    meloxicam (MOBIC) 7.5 MG tablet     thyroid, pork, (WP THYROID) 81.25 mg Tab Take 81.25 mg by mouth once daily.           Clinical Reference Information           Allergies as of 2/2/2017     Sulfa (Sulfonamide Antibiotics)    Adhesive      Immunizations Administered on Date of Encounter - 2/2/2017     None      Orders Placed During Today's Visit      Normal Orders This Visit    Dunbar Visual Field - OU - Extended - Both Eyes       Instructions    Repeat exam in two months.       Language Assistance Services     ATTENTION: Language assistance services are available, free of charge. Please call [phone].      ATENCIÓN: Si vincent barnes, tiene a rojo disposición servicios gratuitos de asistencia lingüística. Llame al [phone].     [facility] Ý: N?u b?n nói Ti?ng Vi?t, có các d?ch v? h? tr? ngôn ng? mi?n phí dành cho b?n. G?i s? [phone].         [facility] complies with applicable Federal civil rights laws and does not discriminate on the basis of race, color, national origin, age, disability, or sex.

## 2017-02-02 NOTE — PROGRESS NOTES
HPI     Eye Problem    Additional comments: Vision problems           Comments   DLE: x 11/16    Pt states has had cataract surgery OS and she was suppose to have sx OD   but a brain tumor was discovered in 11/16. Pt is unable to focus using   both eyes. If she covers OD she can see ok. Pt can not get her eyes to   stay open.     She developed these issues after a craniotomy to remove metastatic   carcinoma from the region of the cerebellum. Both of her upper eyelids   droop. When she lifts her eyelids, she sees double. The diplopia is   vertical and constant.    HVF 24-2 sf done today OU  Taped lids.       Last edited by Brock Louis MD on 2/2/2017  2:00 PM. (History)            Assessment /Plan     For exam results, see Encounter Report.    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 This would correlate with the posterior midbrain lesion noted on MRI. Given time, she may have some improvement in function. I will repeat her exam in two months.

## 2017-02-02 NOTE — LETTER
February 2, 2017      Nguyễn Wong MD  [address]  [address]  [facility] [address]           [place] - Ophthalmology  [address] [place] [address]  Phone: [phone]  Fax: [phone]          Patient: Rosaura David   MR Number: [id number]   YOB: 1950   Date of Visit: 2/2/2017       Dear Dr. Nguyễn Wong:    Thank you for referring Rosaura David to me for evaluation. Attached you will find relevant portions of my assessment and plan of care.    If you have questions, please do not hesitate to call me. I look forward to following Rosaura David along with you.    Sincerely,    Brock Louis MD    Enclosure  CC:  No Recipients    If you would like to receive this communication electronically, please contact [email or web address] or [phone] to request more information on EpicCare Link access.    For providers and/or their staff who would like to refer a patient to [facility], please contact us through our one-stop-shop provider referral line, [facility], at [phone].    If you feel you have received this communication in error or would no longer like to receive these types of communications, please e-mail [email or web address]

## 2017-02-13 ENCOUNTER — PATIENT MESSAGE (OUTPATIENT)
Dept: NEUROSURGERY | Facility: CLINIC | Age: 67
End: 2017-02-13

## 2017-02-14 ENCOUNTER — OFFICE VISIT (OUTPATIENT)
Dept: NEUROSURGERY | Facility: CLINIC | Age: 67
End: 2017-02-14
Payer: MEDICARE

## 2017-02-14 ENCOUNTER — TELEPHONE (OUTPATIENT)
Dept: HEMATOLOGY/ONCOLOGY | Facility: CLINIC | Age: 67
End: 2017-02-14

## 2017-02-14 VITALS
HEIGHT: 65 IN | HEART RATE: 100 BPM | SYSTOLIC BLOOD PRESSURE: 135 MMHG | WEIGHT: 213 LBS | BODY MASS INDEX: 35.49 KG/M2 | DIASTOLIC BLOOD PRESSURE: 80 MMHG

## 2017-02-14 DIAGNOSIS — D49.6 BRAINSTEM TUMOR: ICD-10-CM

## 2017-02-14 DIAGNOSIS — Z98.2 VP (VENTRICULOPERITONEAL) SHUNT STATUS: Primary | ICD-10-CM

## 2017-02-14 DIAGNOSIS — D49.6 BRAINSTEM TUMOR: Primary | ICD-10-CM

## 2017-02-14 DIAGNOSIS — G91.1 OBSTRUCTIVE HYDROCEPHALUS: ICD-10-CM

## 2017-02-14 PROCEDURE — 1157F ADVNC CARE PLAN IN RCRD: CPT | Mod: S$GLB,,, | Performed by: NURSE PRACTITIONER

## 2017-02-14 PROCEDURE — 3079F DIAST BP 80-89 MM HG: CPT | Mod: S$GLB,,, | Performed by: NURSE PRACTITIONER

## 2017-02-14 PROCEDURE — 1159F MED LIST DOCD IN RCRD: CPT | Mod: S$GLB,,, | Performed by: NURSE PRACTITIONER

## 2017-02-14 PROCEDURE — 62252 CSF SHUNT REPROGRAM: CPT | Mod: S$GLB,,, | Performed by: NURSE PRACTITIONER

## 2017-02-14 PROCEDURE — 1126F AMNT PAIN NOTED NONE PRSNT: CPT | Mod: S$GLB,,, | Performed by: NURSE PRACTITIONER

## 2017-02-14 PROCEDURE — 1160F RVW MEDS BY RX/DR IN RCRD: CPT | Mod: S$GLB,,, | Performed by: NURSE PRACTITIONER

## 2017-02-14 PROCEDURE — 3075F SYST BP GE 130 - 139MM HG: CPT | Mod: S$GLB,,, | Performed by: NURSE PRACTITIONER

## 2017-02-14 NOTE — MR AVS SNAPSHOT
[facility]  [address] [place] [address]  Phone: [phone]  Fax: [phone]                  Rosaura David   2017 1:30 PM   Office Visit    Description:  Female : 1950   Provider:  Caron Rahman NP   Department:  [place] - Neurosurgery           Reason for Visit     Follow-up           Diagnoses this Visit        Comments     (ventriculoperitoneal) shunt status    -  Primary     Obstructive hydrocephalus         Brainstem tumor                To Do List           Future Appointments        Provider Department Dept Phone    3/6/2017 1:00 PM CHAIR 08, [facility] CHEMO [facility] [phone]    3/15/2017 9:00 AM [facility] MRI1 [facility] [phone]    3/15/2017 10:30 AM Nguyễn Wong MD [facility] Neurosurgery [phone]    3/20/2017 10:40 AM LABORATORY, [place] [facility] [phone]    2017 2:30 PM Brock Louis MD [facility] Ophthalmology [phone]      Goals (5 Years of Data)     None      [facility]     [facility] Nurse Care Line - 24/7 Assistance  Registered nurses in the [facility] provide clinical advisement, health education, appointment booking, and other advisory services.  Call for this free service at [phone].             Medications           Message regarding Medications     Verify the changes and/or additions to your medication regime listed below are the same as discussed with your clinician today.  If any of these changes or additions are incorrect, please notify your healthcare provider.             Verify that the below list of medications is an accurate representation of the medications you are currently taking.  If none reported, the list may be blank. If incorrect, please contact your healthcare provider. Carry this list with you in case of emergency.           Current Medications     alprazolam (XANAX) 1 MG tablet Take 1 tablet (1  "mg total) by mouth nightly as needed for Insomnia.    amlodipine-benazepril 5-20 mg (LOTREL) 5-20 mg per capsule TAKE 1 CAPSULE EVERY DAY    azelastine (ASTELIN) 137 mcg nasal spray 1 spray (137 mcg total) by Nasal route 2 (two) times daily as needed.    calcium-vitamin D 500-125 mg-unit tablet Take 1 tablet by mouth 2 (two) times daily.     fexofenadine (ALLEGRA) 30 MG tablet Take 30 mg by mouth as needed.     hydrochlorothiazide (HYDRODIURIL) 25 MG tablet Take 1 tablet (25 mg total) by mouth once daily.    lidocaine-prilocaine (EMLA) cream Apply to affected area once    meloxicam (MOBIC) 7.5 MG tablet     thyroid, pork, (WP THYROID) 81.25 mg Tab Take 81.25 mg by mouth once daily.           Clinical Reference Information           Your Vitals Were     BP Pulse Height Weight BMI    135/80 100 5' 5" (1.651 m) 96.6 kg (213 lb) 35.45 kg/m2      Blood Pressure          Most Recent Value    BP  135/80      Allergies as of 2/14/2017     Sulfa (Sulfonamide Antibiotics)    Adhesive      Immunizations Administered on Date of Encounter - 2/14/2017     None      Language Assistance Services     ATTENTION: Language assistance services are available, free of charge. Please call [phone].      ATENCIÓN: Si vincent barnes, tiene a rojo disposición servicios gratuitos de asistencia lingüística. Llame al [phone].     [facility] Ý: N?u b?n nói Ti?ng Vi?t, có các d?ch v? h? tr? ngôn ng? mi?n phí dành cho b?n. G?i s? [phone].         [facility] complies with applicable Federal civil rights laws and does not discriminate on the basis of race, color, national origin, age, disability, or sex.        "

## 2017-02-14 NOTE — PROGRESS NOTES
Patient had MRI earlier today.  shunt setting assessed using Medtronic shunt . Valve incorrectly set in between 2.0 and 0.5.    Using , valve correctly set to 1.5 P/L. Patient tolerated well.     No significant complaint post-gamma knife radiation. Mild soreness at pin sites.     We will schedule MRI brain in 4 weeks with follow-up.     Rosaura MANNING was seen today for follow-up.    Diagnoses and all orders for this visit:     (ventriculoperitoneal) shunt status    Obstructive hydrocephalus    Brainstem tumor

## 2017-02-16 ENCOUNTER — TELEPHONE (OUTPATIENT)
Dept: HEMATOLOGY/ONCOLOGY | Facility: CLINIC | Age: 67
End: 2017-02-16

## 2017-02-24 ENCOUNTER — INFUSION (OUTPATIENT)
Dept: INFUSION THERAPY | Facility: HOSPITAL | Age: 67
End: 2017-02-24
Attending: INTERNAL MEDICINE
Payer: MEDICARE

## 2017-02-24 ENCOUNTER — OFFICE VISIT (OUTPATIENT)
Dept: HEMATOLOGY/ONCOLOGY | Facility: CLINIC | Age: 67
End: 2017-02-24
Payer: MEDICARE

## 2017-02-24 ENCOUNTER — TELEPHONE (OUTPATIENT)
Dept: PHARMACY | Facility: CLINIC | Age: 67
End: 2017-02-24

## 2017-02-24 ENCOUNTER — LAB VISIT (OUTPATIENT)
Dept: LAB | Facility: HOSPITAL | Age: 67
End: 2017-02-24
Attending: INTERNAL MEDICINE
Payer: MEDICARE

## 2017-02-24 VITALS
SYSTOLIC BLOOD PRESSURE: 127 MMHG | BODY MASS INDEX: 35.74 KG/M2 | TEMPERATURE: 99 F | HEIGHT: 65 IN | DIASTOLIC BLOOD PRESSURE: 77 MMHG | WEIGHT: 214.5 LBS | RESPIRATION RATE: 20 BRPM | HEART RATE: 86 BPM

## 2017-02-24 DIAGNOSIS — C50.019 MALIGNANT NEOPLASM OF NIPPLE OF BREAST IN FEMALE, UNSPECIFIED LATERALITY: Primary | ICD-10-CM

## 2017-02-24 DIAGNOSIS — C79.31 BRAIN METASTASES: ICD-10-CM

## 2017-02-24 DIAGNOSIS — C50.019 MALIGNANT NEOPLASM OF NIPPLE OF BREAST IN FEMALE, UNSPECIFIED LATERALITY: ICD-10-CM

## 2017-02-24 DIAGNOSIS — C50.011 MALIGNANT NEOPLASM INVOLVING BOTH NIPPLE AND AREOLA OF RIGHT BREAST IN FEMALE: Primary | ICD-10-CM

## 2017-02-24 DIAGNOSIS — H51.0 PARINAUD'S SYNDROME AFFECTING BOTH EYES: ICD-10-CM

## 2017-02-24 DIAGNOSIS — I10 ESSENTIAL HYPERTENSION: ICD-10-CM

## 2017-02-24 LAB
ALBUMIN SERPL BCP-MCNC: 4.4 G/DL
ALP SERPL-CCNC: 72 U/L
ALT SERPL W/O P-5'-P-CCNC: 43 U/L
ANION GAP SERPL CALC-SCNC: 12 MMOL/L
AST SERPL-CCNC: 26 U/L
BASOPHILS # BLD AUTO: 0.03 K/UL
BASOPHILS NFR BLD: 0.4 %
BILIRUB SERPL-MCNC: 0.5 MG/DL
BUN SERPL-MCNC: 16 MG/DL
CALCIUM SERPL-MCNC: 9.6 MG/DL
CHLORIDE SERPL-SCNC: 102 MMOL/L
CO2 SERPL-SCNC: 29 MMOL/L
CREAT SERPL-MCNC: 0.79 MG/DL
DIFFERENTIAL METHOD: ABNORMAL
EOSINOPHIL # BLD AUTO: 0.1 K/UL
EOSINOPHIL NFR BLD: 1.5 %
ERYTHROCYTE [DISTWIDTH] IN BLOOD BY AUTOMATED COUNT: 13.2 %
EST. GFR  (AFRICAN AMERICAN): >60 ML/MIN/1.73 M^2
EST. GFR  (NON AFRICAN AMERICAN): >60 ML/MIN/1.73 M^2
GLUCOSE SERPL-MCNC: 112 MG/DL
HCT VFR BLD AUTO: 38.5 %
HGB BLD-MCNC: 13.5 G/DL
LYMPHOCYTES # BLD AUTO: 1.3 K/UL
LYMPHOCYTES NFR BLD: 17.9 %
MCH RBC QN AUTO: 29.3 PG
MCHC RBC AUTO-ENTMCNC: 35.1 %
MCV RBC AUTO: 84 FL
MONOCYTES # BLD AUTO: 0.6 K/UL
MONOCYTES NFR BLD: 8.5 %
NEUTROPHILS # BLD AUTO: 5.2 K/UL
NEUTROPHILS NFR BLD: 71.7 %
NRBC BLD-RTO: 0 /100 WBC
PLATELET # BLD AUTO: 214 K/UL
PMV BLD AUTO: 9.6 FL
POTASSIUM SERPL-SCNC: 3.8 MMOL/L
PROT SERPL-MCNC: 7.3 G/DL
RBC # BLD AUTO: 4.6 M/UL
SODIUM SERPL-SCNC: 143 MMOL/L
WBC # BLD AUTO: 7.26 K/UL

## 2017-02-24 PROCEDURE — 85025 COMPLETE CBC W/AUTO DIFF WBC: CPT

## 2017-02-24 PROCEDURE — 96523 IRRIG DRUG DELIVERY DEVICE: CPT | Mod: PN

## 2017-02-24 PROCEDURE — 99214 OFFICE O/P EST MOD 30 MIN: CPT | Mod: S$GLB,,, | Performed by: INTERNAL MEDICINE

## 2017-02-24 PROCEDURE — 1160F RVW MEDS BY RX/DR IN RCRD: CPT | Mod: S$GLB,,, | Performed by: INTERNAL MEDICINE

## 2017-02-24 PROCEDURE — 1126F AMNT PAIN NOTED NONE PRSNT: CPT | Mod: S$GLB,,, | Performed by: INTERNAL MEDICINE

## 2017-02-24 PROCEDURE — 25000003 PHARM REV CODE 250: Mod: PN | Performed by: INTERNAL MEDICINE

## 2017-02-24 PROCEDURE — 80053 COMPREHEN METABOLIC PANEL: CPT

## 2017-02-24 PROCEDURE — 3078F DIAST BP <80 MM HG: CPT | Mod: S$GLB,,, | Performed by: INTERNAL MEDICINE

## 2017-02-24 PROCEDURE — 3074F SYST BP LT 130 MM HG: CPT | Mod: S$GLB,,, | Performed by: INTERNAL MEDICINE

## 2017-02-24 PROCEDURE — 86300 IMMUNOASSAY TUMOR CA 15-3: CPT

## 2017-02-24 PROCEDURE — 99999 PR PBB SHADOW E&M-EST. PATIENT-LVL III: CPT | Mod: PBBFAC,,, | Performed by: INTERNAL MEDICINE

## 2017-02-24 PROCEDURE — 1159F MED LIST DOCD IN RCRD: CPT | Mod: S$GLB,,, | Performed by: INTERNAL MEDICINE

## 2017-02-24 PROCEDURE — 1157F ADVNC CARE PLAN IN RCRD: CPT | Mod: S$GLB,,, | Performed by: INTERNAL MEDICINE

## 2017-02-24 PROCEDURE — 80053 COMPREHEN METABOLIC PANEL: CPT | Mod: PN

## 2017-02-24 PROCEDURE — 85025 COMPLETE CBC W/AUTO DIFF WBC: CPT | Mod: PN

## 2017-02-24 RX ORDER — SODIUM CHLORIDE 0.9 % (FLUSH) 0.9 %
10 SYRINGE (ML) INJECTION
Status: COMPLETED | OUTPATIENT
Start: 2017-02-24 | End: 2017-02-24

## 2017-02-24 RX ORDER — CAPECITABINE 500 MG/1
1000 TABLET, FILM COATED ORAL 2 TIMES DAILY
Qty: 112 TABLET | Refills: 6 | Status: SHIPPED | OUTPATIENT
Start: 2017-02-24 | End: 2017-03-17 | Stop reason: CLARIF

## 2017-02-24 RX ORDER — HEPARIN 100 UNIT/ML
500 SYRINGE INTRAVENOUS
Status: COMPLETED | OUTPATIENT
Start: 2017-02-24 | End: 2017-02-24

## 2017-02-24 RX ADMIN — SODIUM CHLORIDE, PRESERVATIVE FREE 10 ML: 5 INJECTION INTRAVENOUS at 02:02

## 2017-02-24 RX ADMIN — HEPARIN 500 UNITS: 100 SYRINGE at 02:02

## 2017-02-24 NOTE — PROGRESS NOTES
Subjective:       Patient ID: Rosaura David is a 66 y.o. female.    Chief Complaint:met breast ca, s/p metastatectomy.   HPI:   A 66-year-old  woman, who I met during the summer of 2015. The patient  came in to see me in the Medical Oncology Clinic for neoadjuvant chemotherapy   for high-grade infiltrating right breast ductal carcinoma with hypermetabolic   lymph nodes along supraclavicular and subdural internal mammary and right   axillary lymph nodes, SUV of 17-18 were noted. She was triple negative at that   time. The patient received Adriamycin and Cytoxan chemotherapy x4 followed by   Taxotere x4. She had some side effects of neuropathy and at the completion, she  had marked improvement of the hypermetabolic breast mass that was reduced to   almost scar like area and all the lymph nodes that were lighting up in multiple   areas had disappeared. The patient then 09/20/2015, underwent four sentinel   lymph node evaluations that were negative for metastases and right breast   partial mastectomy, which showed benign breast tissue with small focus of fat   necrosis from previous biopsy site. No residual carcinoma was noted. The   patient was sent for Radiation Oncology evaluation opted not to have any   adjuvant radiation therapy and was undergoing surveillance. She had a followup   PET scan for surveillance done in October 2015, which showed recurrence with   interval development of extensive hypermetabolic lymphadenopathy within the   right axillary region, right subpectoral region consistent with metastatic   disease. The largest node in the axilla was 4.8 x 3.7 cm with a maximum SUV of   16.3 and the medial aspect of the right breast also had an irregular   hypermetabolic mass 4.9 x 3 cm with a maximum SUV of 4.6. No other metastatic   areas were found and after much debate, the patient reembarked on the same   Adriamycin and Cytoxan x4. At the completion of therapy, the patient showed   complete  clearing of all the disease noted in the lymph nodes and in the breast.  She had laser mastectomy under Dr. Atwood in [place], no residual dz in breast or lymph nodes on path report.   Pt also had the lipoma under the rt arm removed this was in [place].she then underwent adjuvamt xrt with DR. Santiago.  she presented to the ED in 11/2016  with complaints of worsening dizziness, double vision, right-sided facial weakness, and unsteadiness over 2 weeks found to have met to brain with hydrocephalus   S/P  Shunt 11/26/16; S/P Suboccipital Craniectomy Infratentorial Supracerebellar Resection of Brain Stem Tumor 11/29/16 - all at [facility]     pt has neuroophthal appt for the ptosis.   the new lesion that DR. Wong  did stereotactic [facility]x at [facility]. New mass has also shown triple negativity  Laboratory:     CBC:  Lab Results   Component Value Date    WBC 7.26 02/24/2017    RBC 4.60 02/24/2017    HGB 13.5 02/24/2017    HCT 38.5 02/24/2017    MCV 84 02/24/2017    MCH 29.3 02/24/2017    MCHC 35.1 02/24/2017    RDW 13.2 02/24/2017     02/24/2017    MPV 9.6 02/24/2017    GRAN 5.2 02/24/2017    GRAN 71.7 02/24/2017    LYMPH 1.3 02/24/2017    LYMPH 17.9 (L) 02/24/2017    MONO 0.6 02/24/2017    MONO 8.5 02/24/2017    EOS 0.1 02/24/2017    BASO 0.03 02/24/2017    EOSINOPHIL 1.5 02/24/2017    BASOPHIL 0.4 02/24/2017       BMP: BMP  Lab Results   Component Value Date     02/24/2017    K 3.8 02/24/2017     02/24/2017    CO2 29 02/24/2017    BUN 16 02/24/2017    CREATININE 0.79 02/24/2017    CALCIUM 9.6 02/24/2017    ANIONGAP 12 02/24/2017    ESTGFRAFRICA >60 02/24/2017    EGFRNONAA >60 02/24/2017       LFT:   Lab Results   Component Value Date    ALT 43 02/24/2017    AST 26 02/24/2017    ALKPHOS 72 02/24/2017    BILITOT 0.5 02/24/2017   PET 1/2017 no mets anywhere else      Assessment/Plan:       1. Malignant neoplasm of nipple of breast in female, unspecified laterality        Pt wants to try oral meds that wont cause too much  hair loss, will go with singkle agent xeloda at 1000mg/m2 14/21 day cycle. Labs today, start asap, rtc 3 weeks after start with cbc, cmp

## 2017-02-27 LAB — CANCER AG27-29 SERPL-ACNC: 19 U/ML

## 2017-03-01 ENCOUNTER — TELEPHONE (OUTPATIENT)
Dept: PHARMACY | Facility: CLINIC | Age: 67
End: 2017-03-01

## 2017-03-01 NOTE — TELEPHONE ENCOUNTER
DOCUMENTATION ONLY:  [facility] Approved 2/27/17  Approval Dates: 2/27/17-2/26/18  PA#: [id number]  $490.37 copay    PAF Approval 3/1/17  BIN: [id number]  PCN: [id number]  ID: [id number]  Group: [id number]  $0.00 copay  $5,000 award  3/1/17-3/1/18

## 2017-03-03 NOTE — TELEPHONE ENCOUNTER
Initial Xeloda (capecitabine) consult completed on 3/3/2017 . Capecitabine will be shipped on 3/6/2017 to arrive at patient's home on 3/7/2017 via [employer]. $ 0.00 copay. Patient plans to start capecitabine on 3/8/2017. Address confirmed, CC on file. Confirmed 2 patient identifiers - name and . Therapy Appropriate.     Patient was counseled on the administration directions:  -Take 4 tablets (2,000mg) by mouth twice daily within 30 minutes of a meal.    -Do not chew, crush, or break the tablets.   If possible, patient was instructed tip the tablets from the RX bottle to the cap, and take directly from the cap to the mouth.  Patient may handle the medication with their hands if they wear with a latex or nitrile glove and wash their hands before and after handling the tablets.    Patient was counseled on the following possible side effects, which include, but are not limited to:  swelling, fatigue, weakness/dizziness, hand-foot syndrome, skin irritation, diarrhea/constipation, nausea, vomiting, loss of appetite, mouth sores, hair loss (6%), insomnia, changes in taste, indigestion, increased risk for infection, shortness of breath, and may bleed more easily.  Patient was given Eucerin cream for Hand-Foot Syndrome, and hydrocortisone cream for dermatitis.       DDIs:  Medication list reviewed and potential DDIs addressed.     Patient was given 2 patient education handouts on how to handle oral chemotherapy and specific recommendations- do's and don'ts. Instructed the patient that if they have any remaining oral chemotherapy, not to flush down the toilet or throw away in the trash; The patient or caregiver should return the unused oral chemotherapy to either the clinic or to myself in the Pharmacy where the oral chemotherapy can be disposed of properly.     Patient confirmed understanding. Patient did not have additional questions.  Patient plans to start capecitabine on 3/8/2017. Consultation included: indication; goals  of treatment; administration; storage and handling; side effects; how to handle side effects; the importance of compliance; how to handle missed doses; the importance of laboratory monitoring; the importance of keeping all follow up appointments.  Patient understands to report any medication changes to [facility] and provider. All questions answered and addressed to patients satisfaction. I will f/u with her in 1 week from start, [facility] to contact patient in 2 weeks for refills.

## 2017-03-03 NOTE — TELEPHONE ENCOUNTER
[facility] is requiring a copy of the patient's social security card.  Faxed to [facility] on 3/3/2017 and may take up to 24 hours to process.  Shipment may be delayed; we will reconfirm shipment and start date next week once the [facility] processes social security card.

## 2017-03-07 ENCOUNTER — PATIENT MESSAGE (OUTPATIENT)
Dept: HEMATOLOGY/ONCOLOGY | Facility: CLINIC | Age: 67
End: 2017-03-07

## 2017-03-07 NOTE — TELEPHONE ENCOUNTER
[facility] [place] is now requiring additional income information for patient's rainer. Patient's  does not want to submit income to [facility] to support injustice to cancer patients being done by pharmaceutical companies. would like to proceed with 3 week infusion instead immediately since he is of the impression they would not have to pay for that

## 2017-03-08 DIAGNOSIS — C50.011 MALIGNANT NEOPLASM INVOLVING BOTH NIPPLE AND AREOLA OF RIGHT BREAST IN FEMALE: Primary | ICD-10-CM

## 2017-03-08 RX ORDER — HEPARIN 100 UNIT/ML
500 SYRINGE INTRAVENOUS
Status: CANCELLED | OUTPATIENT
Start: 2017-03-17

## 2017-03-08 RX ORDER — EPINEPHRINE 1 MG/ML
0.3 INJECTION, SOLUTION INTRACARDIAC; INTRAMUSCULAR; INTRAVENOUS; SUBCUTANEOUS ONCE AS NEEDED
Status: CANCELLED | OUTPATIENT
Start: 2017-03-09 | End: 2017-03-09

## 2017-03-08 RX ORDER — DIPHENHYDRAMINE HYDROCHLORIDE 50 MG/ML
50 INJECTION INTRAMUSCULAR; INTRAVENOUS ONCE AS NEEDED
Status: CANCELLED | OUTPATIENT
Start: 2017-03-09 | End: 2017-03-09

## 2017-03-08 RX ORDER — SODIUM CHLORIDE 0.9 % (FLUSH) 0.9 %
10 SYRINGE (ML) INJECTION
Status: CANCELLED | OUTPATIENT
Start: 2017-03-17

## 2017-03-08 RX ORDER — FAMOTIDINE 10 MG/ML
20 INJECTION INTRAVENOUS
Status: CANCELLED | OUTPATIENT
Start: 2017-03-09

## 2017-03-09 ENCOUNTER — TELEPHONE (OUTPATIENT)
Dept: HEMATOLOGY/ONCOLOGY | Facility: CLINIC | Age: 67
End: 2017-03-09

## 2017-03-09 ENCOUNTER — TELEPHONE (OUTPATIENT)
Dept: PHARMACY | Facility: AMBULARY SURGERY CENTER | Age: 67
End: 2017-03-09

## 2017-03-09 RX ORDER — FAMOTIDINE 20 MG/50ML
20 INJECTION, SOLUTION INTRAVENOUS
Status: CANCELLED
Start: 2017-03-17 | End: 2017-03-09

## 2017-03-10 ENCOUNTER — TELEPHONE (OUTPATIENT)
Dept: HEMATOLOGY/ONCOLOGY | Facility: CLINIC | Age: 67
End: 2017-03-10

## 2017-03-10 NOTE — TELEPHONE ENCOUNTER
----- Message from Ulisses Zarate sent at 3/9/2017  4:54 PM CST -----  Contact: self   [phone]  Patient needs to reschedule afternoon appointment for 03/17/2017. Thanks!

## 2017-03-10 NOTE — TELEPHONE ENCOUNTER
Called patient to discuss appointment explained to patient why her appointment was so early, patient stated understanding and will keep appointment.

## 2017-03-15 ENCOUNTER — HOSPITAL ENCOUNTER (OUTPATIENT)
Dept: RADIOLOGY | Facility: HOSPITAL | Age: 67
Discharge: HOME | End: 2017-03-15
Attending: NURSE PRACTITIONER
Payer: MEDICARE

## 2017-03-15 ENCOUNTER — OFFICE VISIT (OUTPATIENT)
Dept: NEUROSURGERY | Facility: CLINIC | Age: 67
End: 2017-03-15
Payer: MEDICARE

## 2017-03-15 VITALS
BODY MASS INDEX: 34.16 KG/M2 | DIASTOLIC BLOOD PRESSURE: 82 MMHG | SYSTOLIC BLOOD PRESSURE: 119 MMHG | HEIGHT: 65 IN | HEART RATE: 91 BPM | WEIGHT: 205 LBS

## 2017-03-15 DIAGNOSIS — C79.31 BRAIN METASTASES: Primary | ICD-10-CM

## 2017-03-15 DIAGNOSIS — F32.A DEPRESSION, UNSPECIFIED DEPRESSION TYPE: Primary | ICD-10-CM

## 2017-03-15 DIAGNOSIS — H51.23 INO (INTERNUCLEAR OPHTHALMOPLEGIA), BILATERAL: ICD-10-CM

## 2017-03-15 DIAGNOSIS — Z98.2 S/P VP SHUNT: ICD-10-CM

## 2017-03-15 DIAGNOSIS — G91.1 OBSTRUCTIVE HYDROCEPHALUS: ICD-10-CM

## 2017-03-15 DIAGNOSIS — R42 DIZZINESS: ICD-10-CM

## 2017-03-15 DIAGNOSIS — D49.6 BRAINSTEM TUMOR: ICD-10-CM

## 2017-03-15 DIAGNOSIS — Z98.890 S/P CRANIOTOMY: ICD-10-CM

## 2017-03-15 DIAGNOSIS — Z92.3 STATUS POST GAMMA KNIFE TREATMENT: ICD-10-CM

## 2017-03-15 DIAGNOSIS — C50.011 MALIGNANT NEOPLASM OF NIPPLE OF RIGHT BREAST IN FEMALE: ICD-10-CM

## 2017-03-15 PROBLEM — H51.20 INO (INTERNUCLEAR OPHTHALMOPLEGIA): Status: ACTIVE | Noted: 2017-03-15

## 2017-03-15 PROCEDURE — 70553 MRI BRAIN STEM W/O & W/DYE: CPT | Mod: TC,PO

## 2017-03-15 PROCEDURE — 99024 POSTOP FOLLOW-UP VISIT: CPT | Mod: S$GLB,,, | Performed by: NEUROLOGICAL SURGERY

## 2017-03-15 PROCEDURE — 70553 MRI BRAIN STEM W/O & W/DYE: CPT | Mod: 26,,, | Performed by: RADIOLOGY

## 2017-03-15 PROCEDURE — 25500020 PHARM REV CODE 255: Mod: PO | Performed by: NURSE PRACTITIONER

## 2017-03-15 PROCEDURE — A9585 GADOBUTROL INJECTION: HCPCS | Mod: PO | Performed by: NURSE PRACTITIONER

## 2017-03-15 RX ORDER — GADOBUTROL 604.72 MG/ML
9 INJECTION INTRAVENOUS
Status: COMPLETED | OUTPATIENT
Start: 2017-03-15 | End: 2017-03-15

## 2017-03-15 RX ADMIN — GADOBUTROL 9 ML: 604.72 INJECTION INTRAVENOUS at 09:03

## 2017-03-15 NOTE — MR AVS SNAPSHOT
[facility]  [address] [place] [address]  Phone: [phone]  Fax: [phone]                  Rosaura David   3/15/2017 10:30 AM   Office Visit    Description:  Female : 1950   Provider:  Nguyễn Wong MD   Department:  [place] - Neurosurgery           Reason for Visit     Post-op Evaluation           Diagnoses this Visit        Comments    Brain metastases    -  Primary     Malignant neoplasm of nipple of right breast in female         Dizziness         Obstructive hydrocephalus         S/P  shunt         S/P craniotomy         HERI (internuclear ophthalmoplegia), bilateral         Status post gamma knife treatment                To Do List           Future Appointments        Provider Department Dept Phone    3/17/2017 8:30 AM CHAIR 28, [facility] CHEMO [facility] [phone]    3/20/2017 10:40 AM LABORATORY, [place] [facility] [phone]    2017 2:30 PM Brock Louis MD [facility] Ophthalmology [phone]    2017 8:30 AM LAB, [facility] DRAW [facility] - Laboratory [phone]    2017 9:00 AM Leanna Bangura MD [facility] Hematology [phone]      Goals (5 Years of Data)     None      [facility] On Call     [facility] Care [facility] -  Assistance  Registered nurses in the [facility] provide clinical advisement, health education, appointment booking, and other advisory services.  Call for this free service at [phone].             Medications           Message regarding Medications     Verify the changes and/or additions to your medication regime listed below are the same as discussed with your clinician today.  If any of these changes or additions are incorrect, please notify your healthcare provider.             Verify that the below list of medications is an accurate representation of the medications you are currently taking.  If none reported, the list may be  "blank. If incorrect, please contact your healthcare provider. Carry this list with you in case of emergency.           Current Medications     alprazolam (XANAX) 1 MG tablet Take 1 tablet (1 mg total) by mouth nightly as needed for Insomnia.    amlodipine-benazepril 5-20 mg (LOTREL) 5-20 mg per capsule TAKE 1 CAPSULE EVERY DAY    azelastine (ASTELIN) 137 mcg nasal spray 1 spray (137 mcg total) by Nasal route 2 (two) times daily as needed.    calcium-vitamin D 500-125 mg-unit tablet Take 1 tablet by mouth 2 (two) times daily.     capecitabine (XELODA) 500 MG Tab Take 4 tablets (2,000 mg total) by mouth 2 (two) times daily.    fexofenadine (ALLEGRA) 30 MG tablet Take 30 mg by mouth as needed.     hydrochlorothiazide (HYDRODIURIL) 25 MG tablet Take 1 tablet (25 mg total) by mouth once daily.    lidocaine-prilocaine (EMLA) cream Apply to affected area once    meloxicam (MOBIC) 7.5 MG tablet     thyroid, pork, (WP THYROID) 81.25 mg Tab Take 81.25 mg by mouth once daily.           Clinical Reference Information           Your Vitals Were     BP Pulse Height Weight BMI    119/82 91 5' 5" (1.651 m) 93 kg (205 lb 0.4 oz) 34.12 kg/m2      Blood Pressure          Most Recent Value    BP  119/82      Allergies as of 3/15/2017     Sulfa (Sulfonamide Antibiotics)    Adhesive      Immunizations Administered on Date of Encounter - 3/15/2017     None      Orders Placed During Today's Visit     Future Labs/Procedures Expected by Expires    MRI Brain W WO Contrast  3/15/2017 3/15/2018      Language Assistance Services     ATTENTION: Language assistance services are available, free of charge. Please call [phone].      ATENCIÓN: Si melila cameron, tiene a rojo disposición servicios gratuitos de asistencia lingüística. Llame al [phone].     [facility] Ý: N?u b?n nói Ti?ng Vi?t, có các d?ch v? h? tr? ngôn ng? mi?n phí dành cho b?n. G?i s? [phone].         [facility] complies with applicable Federal civil rights " laws and does not discriminate on the basis of race, color, national origin, age, disability, or sex.

## 2017-03-15 NOTE — PROGRESS NOTES
Neurosurgery Outpatient Follow Up    Patient ID: Rosaura David is a 66 y.o. female.    Chief Complaint   Patient presents with    Post-op Evaluation     Patient is now 4 weeks s/p gamma knife to brainstem lesion. Reports intermittent, moderate-severe headaches but is otherwise feeling well.            Review of Systems   Constitutional: Positive for fatigue. Negative for activity change, appetite change, chills, fever and unexpected weight change.   HENT: Negative for tinnitus, trouble swallowing and voice change.    Eyes: Positive for visual disturbance.   Respiratory: Negative for apnea, cough, chest tightness and shortness of breath.    Cardiovascular: Negative for chest pain and palpitations.   Gastrointestinal: Negative for constipation, diarrhea, nausea and vomiting.   Genitourinary: Negative for difficulty urinating, dysuria, frequency and urgency.   Musculoskeletal: Negative for back pain, gait problem, neck pain and neck stiffness.   Skin: Negative for wound.   Neurological: Positive for headaches. Negative for dizziness, tremors, seizures, facial asymmetry, speech difficulty, weakness, light-headedness and numbness.   Psychiatric/Behavioral: Negative for confusion and decreased concentration.       Past Medical History:   Diagnosis Date    Arthritis     Brainstem tumor     Breast lesion     LEFT    Cancer     breast    HTN (hypertension)     Malignant neoplasm of nipple of right breast     Murmur     Osteopenia     Presence of dental bridge     LOWER    Seasonal allergies     Thyroid disease     Hypothyroidism    Wears glasses      Social History     Social History    Marital status:      Spouse name: N/A    Number of children: N/A    Years of education: N/A     Occupational History    Not on file.     Social History Main Topics    Smoking status: Never Smoker    Smokeless tobacco: Never Used    Alcohol use No    Drug use: No    Sexual activity: Yes     Partners: Male     Birth  control/ protection: Post-menopausal, Surgical     Other Topics Concern    Not on file     Social History Narrative     Family History   Problem Relation Age of Onset    Congenital heart disease Mother     Heart disease Mother     Colon cancer Father     Breast cancer Sister     Macular degeneration Brother     Cancer Maternal Grandmother      heart    Diabetes Paternal Grandfather     Amblyopia Neg Hx     Blindness Neg Hx     Cataracts Neg Hx     Glaucoma Neg Hx     Hypertension Neg Hx     Retinal detachment Neg Hx     Strabismus Neg Hx     Stroke Neg Hx     Thyroid disease Neg Hx      Review of patient's allergies indicates:   Allergen Reactions    Sulfa (sulfonamide antibiotics) Other (See Comments)     Tongue swelling    Adhesive Other (See Comments)     Errythema. Only with surgical tape       Current Outpatient Prescriptions:     alprazolam (XANAX) 1 MG tablet, Take 1 tablet (1 mg total) by mouth nightly as needed for Insomnia., Disp: 20 tablet, Rfl: 0    amlodipine-benazepril 5-20 mg (LOTREL) 5-20 mg per capsule, TAKE 1 CAPSULE EVERY DAY, Disp: 90 capsule, Rfl: 3    azelastine (ASTELIN) 137 mcg nasal spray, 1 spray (137 mcg total) by Nasal route 2 (two) times daily as needed., Disp: 30 mL, Rfl: 3    calcium-vitamin D 500-125 mg-unit tablet, Take 1 tablet by mouth 2 (two) times daily. , Disp: , Rfl:     capecitabine (XELODA) 500 MG Tab, Take 4 tablets (2,000 mg total) by mouth 2 (two) times daily., Disp: 112 tablet, Rfl: 6    fexofenadine (ALLEGRA) 30 MG tablet, Take 30 mg by mouth as needed. , Disp: , Rfl:     hydrochlorothiazide (HYDRODIURIL) 25 MG tablet, Take 1 tablet (25 mg total) by mouth once daily., Disp: 90 tablet, Rfl: 1    lidocaine-prilocaine (EMLA) cream, Apply to affected area once, Disp: 5 g, Rfl: 0    meloxicam (MOBIC) 7.5 MG tablet, , Disp: , Rfl:     thyroid, pork, (WP THYROID) 81.25 mg Tab, Take 81.25 mg by mouth once daily., Disp: 30 tablet, Rfl: 3  No current  "facility-administered medications for this visit.   Blood pressure 119/82, pulse 91, height 5' 5" (1.651 m), weight 93 kg (205 lb 0.4 oz).      Neurologic Exam     Mental Status   Oriented to person, place, and time.   Follows 3 step commands.   Attention: normal. Concentration: normal.   Speech: speech is normal   Level of consciousness: alert  Knowledge: consistent with education.   Able to name object. Able to read. Able to repeat. Able to write. Normal comprehension.     Cranial Nerves     CN II   Visual acuity: normal  Right visual field deficit: none  Left visual field deficit: none     CN III, IV, VI   Pupils are equal, round, and reactive to light.  Extraocular motions are normal.   Right pupil: Size: 3 mm. Shape: regular. Reactivity: brisk. Consensual response: intact. Accommodation: intact.   Left pupil: Size: 3 mm. Shape: regular. Reactivity: brisk. Consensual response: intact. Accommodation: intact.   CN III: bilateral CN III palsy  Nystagmus: none   Diplopia: bilateral, horizontal and vertical  Ophthalmoparesis: left adduction  Upgaze: abnormal  Downgaze: abnormal  Conjugate gaze: absent    CN V   Right facial sensation deficit: none  Left facial sensation deficit: none    CN VII   Right facial weakness: none  Left facial weakness: none    CN VIII   Hearing: intact    CN IX, X   CN IX normal.   CN X normal.     CN XI   Right sternocleidomastoid strength: normal  Left sternocleidomastoid strength: normal  Right trapezius strength: normal  Left trapezius strength: normal    CN XII   Fasciculations: absent  Tongue deviation: none       Internuclear ophthalmoplegia     Motor Exam   Muscle bulk: normal  Overall muscle tone: normal  Right arm pronator drift: absent  Left arm pronator drift: absent       Deconditioning and 4+/5 gen. motor weakness     Sensory Exam   Light touch normal.   Vibration normal.   Pinprick normal.     Gait, Coordination, and Reflexes     Gait  Gait: wide-based    Coordination "   Romberg: negative  Finger to nose coordination: normal  Heel to shin coordination: abnormal  Tandem walking coordination: abnormal    Tremor   Resting tremor: absent  Intention tremor: absent  Action tremor: absent    Reflexes   Right brachioradialis: 1+  Left brachioradialis: 1+  Right biceps: 1+  Left biceps: 1+  Right triceps: 1+  Left triceps: 1+  Right patellar: 1+  Left patellar: 1+  Right achilles: 1+  Left achilles: 1+  Right plantar: normal  Left plantar: normal  Right Naranjo: absent  Left Naranjo: absent  Right ankle clonus: absent  Left ankle clonus: absent      Physical Exam   Constitutional: She is oriented to person, place, and time. She appears well-developed and well-nourished.   HENT:   Head: Normocephalic and atraumatic.   Eyes: EOM are normal. Pupils are equal, round, and reactive to light.   Neck: Normal range of motion. Neck supple.   Cardiovascular: Normal rate and intact distal pulses.    Pulmonary/Chest: Effort normal. No respiratory distress.   Abdominal: Soft. She exhibits no distension.   Musculoskeletal: Normal range of motion. She exhibits no edema or deformity.   Neurological: She is oriented to person, place, and time. She has an abnormal Heel to Shin Test and an abnormal Tandem Gait Test. She has a normal Finger-Nose-Finger Test and a normal Romberg Test.   Reflex Scores:       Tricep reflexes are 1+ on the right side and 1+ on the left side.       Bicep reflexes are 1+ on the right side and 1+ on the left side.       Brachioradialis reflexes are 1+ on the right side and 1+ on the left side.       Patellar reflexes are 1+ on the right side and 1+ on the left side.       Achilles reflexes are 1+ on the right side and 1+ on the left side.  Skin: Skin is warm and dry.   Psychiatric: She has a normal mood and affect. Her speech is normal and behavior is normal. Judgment and thought content normal.   Nursing note and vitals reviewed.      Provider dictation:  Mrs. Reece is a  66-year-old right-handed  female now 4 weeks status post gamma knife radiosurgery for recurrent metastatic lesions in the brainstem and left cerebellum due to metastatci high grade intraductal breast carcinoma. She also had a suboccipital craniotomy and supracerebellar approach for resection of a pontine brainstem metastatic lesion 3 months ago.  Her primary cancer treatment is going well and she is due to start chemotherapy this week. She has been feeling well with decreased headaches, and improved energy levels.     Her posttreatment MRI today demonstrates no contrast uptake in the previous lesions and reduced volume of the lesions. It appears the tumors are radiosensitive.     Overall I think she has had a very good outcome after gamma knife. We will obtain a new contrast enhanced MRI in 3 months time. Any recurrence or de nelida lesions can be treated with more stereotactic radiosurgery. Further care including chemo is encouraged as well as a new ophthalmology evaluation with Dr. ayala. The patient and the family are having significant psychological difficulties coping with her diagnosis and prognosis and she is very depressed today. We will refer her to psychological counseling.    Visit Diagnosis:  Brain metastases    Malignant neoplasm of nipple of right breast in female    Dizziness    Obstructive hydrocephalus    S/P  shunt    S/P craniotomy    HERI (internuclear ophthalmoplegia), bilateral    Status post gamma knife treatment

## 2017-03-17 ENCOUNTER — TELEPHONE (OUTPATIENT)
Dept: ENDOCRINOLOGY | Facility: CLINIC | Age: 67
End: 2017-03-17

## 2017-03-17 ENCOUNTER — INFUSION (OUTPATIENT)
Dept: INFUSION THERAPY | Facility: HOSPITAL | Age: 67
End: 2017-03-17
Attending: INTERNAL MEDICINE
Payer: MEDICARE

## 2017-03-17 VITALS
RESPIRATION RATE: 18 BRPM | HEART RATE: 89 BPM | DIASTOLIC BLOOD PRESSURE: 81 MMHG | TEMPERATURE: 98 F | OXYGEN SATURATION: 98 % | SYSTOLIC BLOOD PRESSURE: 140 MMHG

## 2017-03-17 DIAGNOSIS — F41.9 ANXIETY: Primary | ICD-10-CM

## 2017-03-17 DIAGNOSIS — C50.011 MALIGNANT NEOPLASM INVOLVING BOTH NIPPLE AND AREOLA OF RIGHT BREAST IN FEMALE: ICD-10-CM

## 2017-03-17 DIAGNOSIS — C50.919 CARCINOMA OF BREAST METASTATIC TO BRAIN, UNSPECIFIED LATERALITY: Primary | ICD-10-CM

## 2017-03-17 DIAGNOSIS — C79.31 CARCINOMA OF BREAST METASTATIC TO BRAIN, UNSPECIFIED LATERALITY: Primary | ICD-10-CM

## 2017-03-17 RX ORDER — SODIUM CHLORIDE 0.9 % (FLUSH) 0.9 %
10 SYRINGE (ML) INJECTION
Status: DISCONTINUED | OUTPATIENT
Start: 2017-03-17 | End: 2017-03-17 | Stop reason: HOSPADM

## 2017-03-17 RX ORDER — HEPARIN 100 UNIT/ML
500 SYRINGE INTRAVENOUS
Status: DISCONTINUED | OUTPATIENT
Start: 2017-03-17 | End: 2017-03-17 | Stop reason: HOSPADM

## 2017-03-17 RX ORDER — PANTOPRAZOLE SODIUM 40 MG/1
40 TABLET, DELAYED RELEASE ORAL DAILY
COMMUNITY
Start: 2017-03-13

## 2017-03-17 RX ORDER — PROCHLORPERAZINE MALEATE 10 MG
10 TABLET ORAL EVERY 6 HOURS PRN
Qty: 30 TABLET | Refills: 1 | Status: SHIPPED | OUTPATIENT
Start: 2017-03-17 | End: 2017-03-21 | Stop reason: SDUPTHER

## 2017-03-17 RX ORDER — LIDOCAINE AND PRILOCAINE 25; 25 MG/G; MG/G
CREAM TOPICAL
Qty: 5 G | Refills: 0 | Status: SHIPPED | OUTPATIENT
Start: 2017-03-17

## 2017-03-17 RX ORDER — FAMOTIDINE 20 MG/50ML
20 INJECTION, SOLUTION INTRAVENOUS
Status: DISCONTINUED | OUTPATIENT
Start: 2017-03-17 | End: 2017-03-17 | Stop reason: HOSPADM

## 2017-03-17 RX ORDER — ALPRAZOLAM 1 MG/1
1 TABLET ORAL NIGHTLY PRN
Qty: 20 TABLET | Refills: 0 | Status: SHIPPED | OUTPATIENT
Start: 2017-03-17 | End: 2017-06-21 | Stop reason: SDUPTHER

## 2017-03-17 RX ORDER — FLUTICASONE PROPIONATE 50 MCG
1 SPRAY, SUSPENSION (ML) NASAL
COMMUNITY
Start: 2017-03-13

## 2017-03-17 NOTE — TELEPHONE ENCOUNTER
----- Message from John Hobson DO sent at 3/17/2017  4:36 PM CDT -----  Let her know her thyroid levels are normal

## 2017-03-17 NOTE — NURSING
Pt into infusion center for C1D1 chemotherapy treatment. Pt unaware of chosen treatment and had several questions, one specifically about efficacy of single agent versus alternated therapies. Pt has very strong feelings about not wanting to lose her hair. It was decided by the patient to schedule appointment with ordering physician for consultation regarding planned treatment and r/s infusion appointment to follow. Office visit scheduled for 3/21/17. Pt informed that she is on the infusion schedule for 3/21/2017 but depending on chosen treatment and authorization, treatment could be delayed until after 3/21/2017.    Ordered labs for Dr. Hobson drawn at patient request to save additional appt next week.  Hand hygiene performed and patient identifed using two patient identifiers.  Venipuncture to left antecubital area using clean technique for ordered labs using 24g Butterfly needle.  Pt tolerated well and gauze and coban applied to area.  Specimens labeled in presence of patient using 2 patient identifiers and taken to lab.Pt left infusion center ambulatory with .

## 2017-03-18 ENCOUNTER — PATIENT MESSAGE (OUTPATIENT)
Dept: NEUROSURGERY | Facility: CLINIC | Age: 67
End: 2017-03-18

## 2017-03-21 ENCOUNTER — INFUSION (OUTPATIENT)
Dept: INFUSION THERAPY | Facility: HOSPITAL | Age: 67
End: 2017-03-21
Attending: INTERNAL MEDICINE
Payer: MEDICARE

## 2017-03-21 ENCOUNTER — OFFICE VISIT (OUTPATIENT)
Dept: HEMATOLOGY/ONCOLOGY | Facility: CLINIC | Age: 67
End: 2017-03-21
Payer: MEDICARE

## 2017-03-21 VITALS — HEART RATE: 86 BPM | SYSTOLIC BLOOD PRESSURE: 140 MMHG | RESPIRATION RATE: 18 BRPM | DIASTOLIC BLOOD PRESSURE: 87 MMHG

## 2017-03-21 VITALS
HEIGHT: 65 IN | SYSTOLIC BLOOD PRESSURE: 140 MMHG | TEMPERATURE: 98 F | BODY MASS INDEX: 34.68 KG/M2 | RESPIRATION RATE: 17 BRPM | DIASTOLIC BLOOD PRESSURE: 83 MMHG | WEIGHT: 208.13 LBS | HEART RATE: 93 BPM

## 2017-03-21 DIAGNOSIS — C50.011 BILATERAL MALIGNANT NEOPLASM OF NIPPLE IN FEMALE: Primary | ICD-10-CM

## 2017-03-21 DIAGNOSIS — C50.919 BREAST CA: ICD-10-CM

## 2017-03-21 DIAGNOSIS — F41.9 ANXIETY: ICD-10-CM

## 2017-03-21 DIAGNOSIS — Z98.890 S/P CRANIOTOMY: ICD-10-CM

## 2017-03-21 DIAGNOSIS — C50.011 MALIGNANT NEOPLASM INVOLVING BOTH NIPPLE AND AREOLA OF RIGHT BREAST IN FEMALE: ICD-10-CM

## 2017-03-21 DIAGNOSIS — Z92.3 STATUS POST GAMMA KNIFE TREATMENT: ICD-10-CM

## 2017-03-21 DIAGNOSIS — C79.31 CARCINOMA OF BREAST METASTATIC TO BRAIN, UNSPECIFIED LATERALITY: ICD-10-CM

## 2017-03-21 DIAGNOSIS — C79.31 CARCINOMA OF BREAST METASTATIC TO BRAIN, UNSPECIFIED LATERALITY: Primary | ICD-10-CM

## 2017-03-21 DIAGNOSIS — C50.011 MALIGNANT NEOPLASM OF NIPPLE OF RIGHT BREAST IN FEMALE: Primary | ICD-10-CM

## 2017-03-21 DIAGNOSIS — H51.0 PARINAUD'S SYNDROME AFFECTING BOTH EYES: ICD-10-CM

## 2017-03-21 DIAGNOSIS — C50.919 CARCINOMA OF BREAST METASTATIC TO BRAIN, UNSPECIFIED LATERALITY: ICD-10-CM

## 2017-03-21 DIAGNOSIS — G93.89 BRAIN MASS: ICD-10-CM

## 2017-03-21 DIAGNOSIS — R49.0 HOARSENESS: ICD-10-CM

## 2017-03-21 DIAGNOSIS — C50.919 CARCINOMA OF BREAST METASTATIC TO BRAIN, UNSPECIFIED LATERALITY: Primary | ICD-10-CM

## 2017-03-21 DIAGNOSIS — C50.012 BILATERAL MALIGNANT NEOPLASM OF NIPPLE IN FEMALE: Primary | ICD-10-CM

## 2017-03-21 PROCEDURE — 25000003 PHARM REV CODE 250: Mod: PN | Performed by: INTERNAL MEDICINE

## 2017-03-21 PROCEDURE — 99999 PR PBB SHADOW E&M-EST. PATIENT-LVL III: CPT | Mod: PBBFAC,,, | Performed by: INTERNAL MEDICINE

## 2017-03-21 PROCEDURE — 96415 CHEMO IV INFUSION ADDL HR: CPT | Mod: PN

## 2017-03-21 PROCEDURE — 96413 CHEMO IV INFUSION 1 HR: CPT | Mod: PN

## 2017-03-21 PROCEDURE — 1159F MED LIST DOCD IN RCRD: CPT | Mod: S$GLB,,, | Performed by: INTERNAL MEDICINE

## 2017-03-21 PROCEDURE — 99215 OFFICE O/P EST HI 40 MIN: CPT | Mod: S$GLB,,, | Performed by: INTERNAL MEDICINE

## 2017-03-21 PROCEDURE — 1157F ADVNC CARE PLAN IN RCRD: CPT | Mod: S$GLB,,, | Performed by: INTERNAL MEDICINE

## 2017-03-21 PROCEDURE — 3078F DIAST BP <80 MM HG: CPT | Mod: S$GLB,,, | Performed by: INTERNAL MEDICINE

## 2017-03-21 PROCEDURE — 3077F SYST BP >= 140 MM HG: CPT | Mod: S$GLB,,, | Performed by: INTERNAL MEDICINE

## 2017-03-21 PROCEDURE — 99999 PR PBB SHADOW E&M-EST. PATIENT-LVL II: CPT | Mod: PBBFAC,,, | Performed by: INTERNAL MEDICINE

## 2017-03-21 PROCEDURE — 1160F RVW MEDS BY RX/DR IN RCRD: CPT | Mod: S$GLB,,, | Performed by: INTERNAL MEDICINE

## 2017-03-21 PROCEDURE — 96367 TX/PROPH/DG ADDL SEQ IV INF: CPT | Mod: PN

## 2017-03-21 PROCEDURE — 63600175 PHARM REV CODE 636 W HCPCS: Mod: PN | Performed by: INTERNAL MEDICINE

## 2017-03-21 RX ORDER — FAMOTIDINE 20 MG/50ML
20 INJECTION, SOLUTION INTRAVENOUS
Status: CANCELLED | OUTPATIENT
Start: 2017-03-21 | End: 2017-03-21

## 2017-03-21 RX ORDER — SODIUM CHLORIDE 0.9 % (FLUSH) 0.9 %
10 SYRINGE (ML) INJECTION
Status: CANCELLED | OUTPATIENT
Start: 2017-03-21

## 2017-03-21 RX ORDER — PROCHLORPERAZINE MALEATE 10 MG
10 TABLET ORAL EVERY 6 HOURS PRN
Qty: 90 TABLET | Refills: 6 | Status: SHIPPED | OUTPATIENT
Start: 2017-03-21 | End: 2017-07-07 | Stop reason: SDUPTHER

## 2017-03-21 RX ORDER — SODIUM CHLORIDE 0.9 % (FLUSH) 0.9 %
10 SYRINGE (ML) INJECTION
Status: DISCONTINUED | OUTPATIENT
Start: 2017-03-21 | End: 2017-03-21 | Stop reason: HOSPADM

## 2017-03-21 RX ORDER — HEPARIN 100 UNIT/ML
500 SYRINGE INTRAVENOUS
Status: DISCONTINUED | OUTPATIENT
Start: 2017-03-21 | End: 2017-03-21 | Stop reason: HOSPADM

## 2017-03-21 RX ORDER — ONDANSETRON 8 MG/1
8 TABLET, ORALLY DISINTEGRATING ORAL EVERY 12 HOURS PRN
Qty: 60 TABLET | Refills: 1 | Status: SHIPPED | OUTPATIENT
Start: 2017-03-21 | End: 2017-09-01

## 2017-03-21 RX ORDER — FAMOTIDINE 20 MG/50ML
20 INJECTION, SOLUTION INTRAVENOUS
Status: COMPLETED | OUTPATIENT
Start: 2017-03-21 | End: 2017-03-21

## 2017-03-21 RX ORDER — HEPARIN 100 UNIT/ML
500 SYRINGE INTRAVENOUS
Status: CANCELLED | OUTPATIENT
Start: 2017-03-21

## 2017-03-21 RX ADMIN — SODIUM CHLORIDE, PRESERVATIVE FREE 10 ML: 5 INJECTION INTRAVENOUS at 12:03

## 2017-03-21 RX ADMIN — SODIUM CHLORIDE, PRESERVATIVE FREE 500 UNITS: 5 INJECTION INTRAVENOUS at 05:03

## 2017-03-21 RX ADMIN — SODIUM CHLORIDE 16 MG: 9 INJECTION, SOLUTION INTRAVENOUS at 02:03

## 2017-03-21 RX ADMIN — SODIUM CHLORIDE: 0.9 INJECTION, SOLUTION INTRAVENOUS at 12:03

## 2017-03-21 RX ADMIN — SODIUM CHLORIDE, PRESERVATIVE FREE 10 ML: 5 INJECTION INTRAVENOUS at 05:03

## 2017-03-21 RX ADMIN — PACLITAXEL 372 MG: 6 INJECTION, SOLUTION INTRAVENOUS at 02:03

## 2017-03-21 RX ADMIN — SODIUM CHLORIDE 20 MG: 9 INJECTION, SOLUTION INTRAVENOUS at 01:03

## 2017-03-21 RX ADMIN — DIPHENHYDRAMINE HYDROCHLORIDE 50 MG: 50 INJECTION, SOLUTION INTRAMUSCULAR; INTRAVENOUS at 12:03

## 2017-03-21 RX ADMIN — FAMOTIDINE 20 MG: 20 INJECTION, SOLUTION INTRAVENOUS at 01:03

## 2017-03-21 NOTE — PROGRESS NOTES
Subjective:       Patient ID: Rosaura David is a 66 y.o. female.    Chief Complaint:met breast ca, s/p metastatectomy.   HPI:   A 66-year-old  woman, who I met during the summer of 2015. The patient  came in to see me in the Medical Oncology Clinic for neoadjuvant chemotherapy   for high-grade infiltrating right breast ductal carcinoma with hypermetabolic   lymph nodes along supraclavicular and subdural internal mammary and right   axillary lymph nodes, SUV of 17-18 were noted. She was triple negative at that   time. The patient received Adriamycin and Cytoxan chemotherapy x4 followed by   Taxotere x4. She had some side effects of neuropathy and at the completion, she  had marked improvement of the hypermetabolic breast mass that was reduced to   almost scar like area and all the lymph nodes that were lighting up in multiple   areas had disappeared. The patient then 09/20/2015, underwent four sentinel   lymph node evaluations that were negative for metastases and right breast   partial mastectomy, which showed benign breast tissue with small focus of fat   necrosis from previous biopsy site. No residual carcinoma was noted. The   patient was sent for Radiation Oncology evaluation opted not to have any   adjuvant radiation therapy and was undergoing surveillance. She had a followup   PET scan for surveillance done in October 2015, which showed recurrence with   interval development of extensive hypermetabolic lymphadenopathy within the   right axillary region, right subpectoral region consistent with metastatic   disease. The largest node in the axilla was 4.8 x 3.7 cm with a maximum SUV of   16.3 and the medial aspect of the right breast also had an irregular   hypermetabolic mass 4.9 x 3 cm with a maximum SUV of 4.6. No other metastatic   areas were found and after much debate, the patient reembarked on the same   Adriamycin and Cytoxan x4. At the completion of therapy, the patient showed   complete  clearing of all the disease noted in the lymph nodes and in the breast.  She had laser mastectomy under Dr. Atwood in [place], no residual dz in breast or lymph nodes on path report.   Pt also had the lipoma under the rt arm removed this was in [place].she then underwent adjuvamt xrt with DR. Santiago.  she presented to the ED in 11/2016  with complaints of worsening dizziness, double vision, right-sided facial weakness, and unsteadiness over 2 weeks found to have met to brain with hydrocephalus   S/P  Shunt 11/26/16; S/P Suboccipital Craniectomy Infratentorial Supracerebellar Resection of Brain Stem Tumor 11/29/16 - all at [facility]     pt has neuroophthal appt for the ptosis.   the new lesion that DR. Wong  did stereotactic sx at [facility]. New mass has also shown triple negativity   pt has discussed multiple rx options and now makes an urgent visit to discuss rx again that's due to start today. I do not advice any further delay in start of rx  She had dilatation done of her esophagus because of difficulty swallowing, she also sounds a lot more hoarse, and has a raspy voicem,  states this maybe sinus issues  Laboratory:     CBC:  Lab Results   Component Value Date    WBC 7.26 02/24/2017    RBC 4.60 02/24/2017    HGB 13.5 02/24/2017    HCT 38.5 02/24/2017    MCV 84 02/24/2017    MCH 29.3 02/24/2017    MCHC 35.1 02/24/2017    RDW 13.2 02/24/2017     02/24/2017    MPV 9.6 02/24/2017    GRAN 5.2 02/24/2017    GRAN 71.7 02/24/2017    LYMPH 1.3 02/24/2017    LYMPH 17.9 (L) 02/24/2017    MONO 0.6 02/24/2017    MONO 8.5 02/24/2017    EOS 0.1 02/24/2017    BASO 0.03 02/24/2017    EOSINOPHIL 1.5 02/24/2017    BASOPHIL 0.4 02/24/2017       BMP: BMP  Lab Results   Component Value Date     02/24/2017    K 3.8 02/24/2017     02/24/2017    CO2 29 02/24/2017    BUN 16 02/24/2017    CREATININE 0.79 02/24/2017    CALCIUM 9.6 02/24/2017    ANIONGAP 12 02/24/2017    ESTGFRAFRICA >60 02/24/2017    EGFRNONAA >60 02/24/2017        LFT:   Lab Results   Component Value Date    ALT 43 02/24/2017    AST 26 02/24/2017    ALKPHOS 72 02/24/2017    BILITOT 0.5 02/24/2017   PET 1/2017 no mets anywhere else      Assessment/Plan:       1. Bilateral malignant neoplasm of nipple in female    2. Anxiety    3. Hoarseness      I am concerned about the degree of hoarseness, and occ raspy sounds I am hearing resp status, I will refer to ENT and pulm to evaluate this  Pt has multiple concerns regarding the taxol and this has been discussed at length today, she will go for infusion today, she wants minimal s/e which is understandable.   They didn't proceed with the xeloda due to financial situation and cost of drug.    she wanted to try abraxane but the sinificant neuropathy seems to cause hesitation as well.   rtc 3 weeks with cbc, cmp, infusion today

## 2017-03-21 NOTE — MR AVS SNAPSHOT
Patient Information     Patient Name Sex     Rosaura David Female 1950      Visit Information        Provider Department Dept Phone Center    3/21/2017 11:30 AM CHAIR [facility] CHEMO [facility] [phone] [facility]      Patient Instructions    Zofran EVERY 8 hours  Compazine may be taken every 6 hours (use fall precautions when taking)      Mouth Care During Chemotherapy     Brush gently with an extra soft toothbrush and mild toothpaste.     Mouth sores (stomatitis) and dry mouth are common side effects of chemotherapy and radiation therapy. These side effects occur because these treatments affect normal cells as well as cancer cells. Using the tips on this handout may help you feel better.   Remedies that help  · Rinse with 1/2 teaspoon baking soda and 1/4 teaspoon salt mixed in 1 cup of warm water. This helps keep your mouth free of germs.  · Use products that coat and protect the mouth and throat. Or use medications that coat and soothe mouth sores themselves.  · Numb your mouth and throat with special sprays or lozenges to make eating easier.  Prevent mouth sores  · Buy an extra soft toothbrush and mild toothpaste.  · Gently brush your teeth and gums.  · Have your dentist treat any dental problems before your therapy begins.  Moisten a dry mouth  · Drink plenty of water. Take frequent sips or suck on ice chips.  · Suck on sugar-free candy and lozenges. Chew sugar-free gum.  · Use products that moisten the mouth if your doctor prescribes them.  · Apply lip balm to help prevent dry lips.  · Avoid mouthwash that contains alcohol.  Choose foods less likely to irritate  Try foods that are:  · Soft and go down smoothly, such as a milkshake or food puréed with a   · Served cold or at room temperature  · Cooked until tender and cut into small pieces  Avoid foods that are:  · Sharp or crunchy  · Hot, salty, or spicy  · Acidic, such as citrus juices  When to seek medical  advice  · You develop mouth sores  · Mouth pain keeps you from eating or resting   Date Last Reviewed: 1/5/2016  © 2784-3318 [employer]. [address] [place] [address], [place] [address]. All rights reserved. This information is not intended as a substitute for professional medical care. Always follow your healthcare professional's instructions.        Nutrition During Chemotherapy     Drink plenty of liquids, such as water.     During chemotherapy, the energy provided by a healthy diet can help you rebuild normal cells. It can also help you keep up your strength and fight infection. As a result, you may feel better and be more able to cope with side effects. Ask your doctor about your nutrition needs.  Drink plenty of fluids  · Fluids help the body produce urine and decrease constipation. They help prevent kidney and bladder problems. They also help replace fluids lost from vomiting and diarrhea.  · Try water, unsweetened juices, and other flavored drinks without caffeine. They flush toxins from the body.  Get enough calories  · Calories are fuel. The body uses this fuel to perform all of its functions, including healing.  · Its OK to be lean, but be sure you are not underweight. If you are, try eating more calories.  · Eat calorie-dense foods such as avocados, peanut butter, eggs, and ice cream.  · If you need extra calories, add butter, gravy, and sauces to foods (if tolerated).  · If you don't need the extra calories, try to limit foods that are fried, greasy, or high in fat or added sugar.  Eat protein, fruits, and vegetables  · Protein builds muscle, bone, skin, and blood. It helps your body heal and fight infection. It also helps boost your energy level.  · Good protein choices include yogurt, eggs, chicken, lean meats, beans, and peanut butter.  · Fruits and vegetables are full of important vitamins, minerals, and fiber to help your body function properly.  · Try to eat a variety of vegetables,  fruits, whole grains, and beans.  · Ask your doctor about instant protein powder or other supplements.  Eating right during treatment  Side effects may make it a little harder to eat well on some days. The following tips will help you continue to get the nutrition you need:  · Be open to new foods and recipes.  · Eat small portions often and slowly.  · Have a healthy snack instead of a meal if you are not very hungry.  · Try eating in a new setting.  · Physical activity, such as walking, can help increase your appetite. Try to be active for at least 30 minutes each day.  · Boost your diet by getting the vitamins and minerals you need from fruits, vegetables, and whole grains.  · If you live alone and are not up to cooking, ask your healthcare provider about Meals on Wheels or other outreach programs.  · Sometimes, it is best to follow your appetitie. Eat when you are hungry, but when you ar enot, forcing yourself to eat can make you feel bad, nauseated, or even cause you to vomit.   Date Last Reviewed: 1/6/2016 © 2000-2016 [employer]. [address] [place] [address]. All rights reserved. This information is not intended as a substitute for professional medical care. Always follow your healthcare professional's instructions.             Your Current Medications Are     alprazolam (XANAX) 1 MG tablet    amlodipine-benazepril 5-20 mg (LOTREL) 5-20 mg per capsule    azelastine (ASTELIN) 137 mcg nasal spray    calcium-vitamin D 500-125 mg-unit tablet    fexofenadine (ALLEGRA) 30 MG tablet    fluticasone (FLONASE) 50 mcg/actuation nasal spray    hydrochlorothiazide (HYDRODIURIL) 25 MG tablet    lidocaine-prilocaine (EMLA) cream    meloxicam (MOBIC) 7.5 MG tablet    pantoprazole (PROTONIX) 40 MG tablet    thyroid, pork, (WP THYROID) 81.25 mg Tab    prochlorperazine (COMPAZINE) 10 MG tablet (Discontinued)    guaifenesin 200 mg/5 mL Liqd    ondansetron (ZOFRAN-ODT) 8 MG TbDL    prochlorperazine  (COMPAZINE) 10 MG tablet      Facility-Administered Medications     dexamethasone IVPB 20 mg    diphenhydramine IVPB 50 mg    famotidine IVPB 20 mg    heparin, porcine (PF) 100 unit/mL injection flush 500 Units    ondansetron (ZOFRAN) 16 mg in NS 50 mL IVPB    paclitaxel (TAXOL) 175 mg/m2 = 372 mg in sodium chloride 0.9% 500 mL chemo infusion    sodium chloride 0.9% 250 mL flush bag    sodium chloride 0.9% flush 10 mL    ondansetron 16 mg in sodium chloride 0.9% 50 mL IVPB (Discontinued)      Appointments for Next Year     3/22/2017  9:20 AM CONSULT - ENT ([facility]) (20 min.) [facility] ENT Chandrika Li NP    Arrive at check-in approximately 15 minutes before your scheduled appointment time. Bring all outside medical records and imaging, along with a list of your current medications and insurance card.    2nd Floor    4/6/2017  2:30 PM ESTABLISHED PATIENT (15 min.) [place] - Ophthalmology Brock Louis MD    Arrive at check-in approximately 15 minutes before your scheduled appointment time. Bring all outside medical records and imaging, along with a list of your current medications and insurance card.    2nd Floor    4/7/2017  9:05 AM NON FASTING LAB (5 min.) [facility] LABORATORY, [place]    Arrive at check-in approximately 15 minutes before your scheduled appointment time. Bring all outside medical records and imaging, along with a list of your current medications and insurance card.    4/11/2017  9:00 AM ESTABLISHED PATIENT (20 min.) [facility] - Hematology Leanna Bangura MD    Arrive at check-in approximately 15 minutes before your scheduled appointment time. Bring all outside medical records and imaging, along with a list of your current medications and insurance card.    4/11/2017 10:30 AM INFUSION 300 MIN (300 min.) [facility] CHAIR 21, [facility] CHEMO    Arrive at check-in approximately 15 minutes before your scheduled appointment time. Bring all outside  medical records and imaging, along with a list of your current medications and insurance card.    1st Floor    6/15/2017  9:00 AM MRI BRAIN CONT (60 min.) [facility] MRI1    Arrive at check-in approximately 30 minutes before your scheduled appointment time. Bring all outside medical records and imaging, along with a list of your current medications and insurance card.  Magnetic Resonance Imaging- You will not be allowed to have the MRI if you have a cardiac pace-maker, implantable defibrillator, neurostimulator, biostimulator, or if you have anuerysm clips in your brain (from many years ago).  WHAT YOUR DOCTOR NEEDS TO KNOW: You should tell your doctor if you have any metal in your body either from surgery or an injury. This includes metal pins, clips, plates, screws, schrapnel, ear implants, or permanent eyeliner. If female, you should tell your doctor if there is a chance you might be pregnant. Please bring with you any papers your doctor has given you to sign. Please bring with you a list of medications you are currently taking!  PLEASE REPORT TO THE MAGNETIC RESONANCE CENTER 30 MINUTES PRIOR TO YOUR SCHEDULED APPOINTMENT TIME, E UNLESS YOU ARE A PEDIATRIC PATIENT THAT REQUIRES SEDATION OR ANESTHESIA, PLEASE ARRIVE 1 HOUR 30 MINUTES PRIOR TO YOUR APPOINTMENT AS THE [facility] ANESTHESIOLOGIST HAS TO DO THEIR ASSESSMENT.  WHAT IS THE PURPOSE OF THIS TEST: An MRI is a painless test that takes pictures of the inside of your body.  The pictures are taken in slices which show only a few layers of body tissue at a time.  Pictures taken this way can help your doctor find and see problems in the body more easily.  The MRI uses a magnetic field and radio waves instead of X-RAYS.  WHERE WILL YOUR TEST BE DONE AND BY WHOM? The test will be done in the MRI building. It will be done under the supervision of a radiologist and a radiologic technologist. The person giving you these instructions will tell you  where to report for this test.  It usually takes 30 minutes to 1 hour.  HOW TO PREPARE FOR YOUR TEST: Wear comfortable clothing with no metal buttons or zippers. Do not wear jewelry including rings, earrings, necklaces, bracelets, or watches. Take all metal objects out of your hair. You will be asked to answer yes or no on a questionaire form regarding the possibility of any metal in your body.  Please bring someone with you if you are unable to answer the questions.  A parent must accompany any child having an MRI.  Tell your doctor if you are afraid of being in a closed or cramped space. Your doctor will decide if you need medicine to help you relax. A small needle may be placed in a vein in your arm or hand so that you may receive a contrast solution.  THIS IS NOT A DYE AND DOES NOT CONTAIN IODINE. No special preparation for any MRI exam EXCEPT for a Magnetic Resonance Cholangiopancreatoram which the patient should fast for 4 hours prior to the scheduled appointment time.  You may take your usual medication with a small sip of water.  WHAT TO EXPECT DURING YOUR TEST: The radiologic technologist will check to make sure that you have removed all metal objects. You will be asked to lie down on the table of the MRI machine. To allow for the best quality exam, it is VERY IMPORTANT that you LIE VERY STILL during the exam. When the test starts the table will move into the open tunnel of the machine. Once the machine starts taking pictures, you will hear loud hammering or grinding noises. You may be able to wear headphones and hear music to block out the loud noises of the machine. If your test requires the use of contrast material, a small needle will be placed in a vein of your arm or hand. The contrast material will be pushed through the IV tube that has been placed in your arm or hand. The contrast material will be pushed through the IV tube that has been placed in your arm or hand. The skin around your IV may feel  "warm or cold.You should not have any changes in the way you feel. If you do, please tell the technologist.  WHAT TO EXPECT AFTER THE EXAM: If you were given medication to relax you, someone else will need to drive you home. DO NOT DRIVE OR USE HEAVY EQUIPMENT IF YOU TAKE MEDICATION THAT MAKES YOU DROWSY. You may resume normal daily activity if you did not receive sedation.  WHAT YOU NEED TO KNOW ABOUT YOUR APPOINTMENT: If you are unable to follow the above instructions or have questions or if you are delayed or unable to keep your scheduled appointment, please notify the MRI Department where you appointment is scheduled.    1st Floor    6/30/2017 10:00 AM NON FASTING LAB (5 min.) [facility]    Arrive at check-in approximately 15 minutes before your scheduled appointment time. Bring all outside medical records and imaging, along with a list of your current medications and insurance card.    7/7/2017 10:30 AM EST PATIENT - ENDOCRINE (30 min.) [facility] Endocrinology John Hobson DO    Arrive at check-in approximately 15 minutes before your scheduled appointment time. Bring all outside medical records and imaging, along with a list of your current medications and insurance card.    1st floor         Default Flowsheet Data (last 24 hours)      Amb Complex Vitals Len        03/21/17 1034                Measurements    Weight 94.4 kg (208 lb 1.8 oz)        Height 5' 5" (1.651 m)        BSA (Calculated - sq m) 2.08 sq meters        BMI (Calculated) 34.7        BP (!)  140/83        Temp 98.2 °F (36.8 °C)        Pulse 93        Resp 17        Pain Assessment    Pain Score Zero                Allergies     Sulfa (Sulfonamide Antibiotics) Other (See Comments)    Tongue swelling    Adhesive Other (See Comments)    Errythema. Only with surgical tape      Medications You Received from 03/20/2017 1722 to 03/21/2017 1722        Date/Time Order Dose Route Action     03/21/2017 1350 " dexamethasone IVPB 20 mg 20 mg Intravenous New Bag     03/21/2017 1246 diphenhydramine IVPB 50 mg 50 mg Intravenous New Bag     03/21/2017 1323 famotidine IVPB 20 mg 20 mg Intravenous New Bag     03/21/2017 1408 ondansetron (ZOFRAN) 16 mg in NS 50 mL IVPB 16 mg Intravenous New Bag     03/21/2017 1434 paclitaxel (TAXOL) 175 mg/m2 = 372 mg in sodium chloride 0.9% 500 mL chemo infusion 372 mg Intravenous New Bag     03/21/2017 1246 sodium chloride 0.9% 250 mL flush bag   Intravenous New Bag     03/21/2017 1242 sodium chloride 0.9% flush 10 mL 10 mL Intravenous Given      Current Discharge Medication List     Cannot display discharge medications since this is not an admission.

## 2017-03-21 NOTE — PLAN OF CARE
Problem: Patient Care Overview  Goal: Plan of Care Review  Outcome: Ongoing (interventions implemented as appropriate)  Pt. Completed treatment, tolerated without noted distress.Vtial signs stable. Patient discharged from infusion center ambulatory with rolling walker and . Patient had all present questions answered.

## 2017-03-21 NOTE — PATIENT INSTRUCTIONS
Zofran EVERY 8 hours  Compazine may be taken every 6 hours (use fall precautions when taking)      Mouth Care During Chemotherapy     Brush gently with an extra soft toothbrush and mild toothpaste.     Mouth sores (stomatitis) and dry mouth are common side effects of chemotherapy and radiation therapy. These side effects occur because these treatments affect normal cells as well as cancer cells. Using the tips on this handout may help you feel better.   Remedies that help  · Rinse with 1/2 teaspoon baking soda and 1/4 teaspoon salt mixed in 1 cup of warm water. This helps keep your mouth free of germs.  · Use products that coat and protect the mouth and throat. Or use medications that coat and soothe mouth sores themselves.  · Numb your mouth and throat with special sprays or lozenges to make eating easier.  Prevent mouth sores  · Buy an extra soft toothbrush and mild toothpaste.  · Gently brush your teeth and gums.  · Have your dentist treat any dental problems before your therapy begins.  Moisten a dry mouth  · Drink plenty of water. Take frequent sips or suck on ice chips.  · Suck on sugar-free candy and lozenges. Chew sugar-free gum.  · Use products that moisten the mouth if your doctor prescribes them.  · Apply lip balm to help prevent dry lips.  · Avoid mouthwash that contains alcohol.  Choose foods less likely to irritate  Try foods that are:  · Soft and go down smoothly, such as a milkshake or food puréed with a   · Served cold or at room temperature  · Cooked until tender and cut into small pieces  Avoid foods that are:  · Sharp or crunchy  · Hot, salty, or spicy  · Acidic, such as citrus juices  When to seek medical advice  · You develop mouth sores  · Mouth pain keeps you from eating or resting   Date Last Reviewed: 1/5/2016  © 2962-4846 [employer]. [address] [place] [address], [place] [address]. All rights reserved. This information is not intended as a substitute for professional  medical care. Always follow your healthcare professional's instructions.        Nutrition During Chemotherapy     Drink plenty of liquids, such as water.     During chemotherapy, the energy provided by a healthy diet can help you rebuild normal cells. It can also help you keep up your strength and fight infection. As a result, you may feel better and be more able to cope with side effects. Ask your doctor about your nutrition needs.  Drink plenty of fluids  · Fluids help the body produce urine and decrease constipation. They help prevent kidney and bladder problems. They also help replace fluids lost from vomiting and diarrhea.  · Try water, unsweetened juices, and other flavored drinks without caffeine. They flush toxins from the body.  Get enough calories  · Calories are fuel. The body uses this fuel to perform all of its functions, including healing.  · Its OK to be lean, but be sure you are not underweight. If you are, try eating more calories.  · Eat calorie-dense foods such as avocados, peanut butter, eggs, and ice cream.  · If you need extra calories, add butter, gravy, and sauces to foods (if tolerated).  · If you don't need the extra calories, try to limit foods that are fried, greasy, or high in fat or added sugar.  Eat protein, fruits, and vegetables  · Protein builds muscle, bone, skin, and blood. It helps your body heal and fight infection. It also helps boost your energy level.  · Good protein choices include yogurt, eggs, chicken, lean meats, beans, and peanut butter.  · Fruits and vegetables are full of important vitamins, minerals, and fiber to help your body function properly.  · Try to eat a variety of vegetables, fruits, whole grains, and beans.  · Ask your doctor about instant protein powder or other supplements.  Eating right during treatment  Side effects may make it a little harder to eat well on some days. The following tips will help you continue to get the nutrition you need:  · Be open  to new foods and recipes.  · Eat small portions often and slowly.  · Have a healthy snack instead of a meal if you are not very hungry.  · Try eating in a new setting.  · Physical activity, such as walking, can help increase your appetite. Try to be active for at least 30 minutes each day.  · Boost your diet by getting the vitamins and minerals you need from fruits, vegetables, and whole grains.  · If you live alone and are not up to cooking, ask your healthcare provider about Meals on Wheels or other outreach programs.  · Sometimes, it is best to follow your appetitie. Eat when you are hungry, but when you ar enot, forcing yourself to eat can make you feel bad, nauseated, or even cause you to vomit.   Date Last Reviewed: 1/6/2016  © 6602-2160 [employer]. [address] [place] [address], [place] [address]. All rights reserved. This information is not intended as a substitute for professional medical care. Always follow your healthcare professional's instructions.

## 2017-03-21 NOTE — MR AVS SNAPSHOT
[facility] Hematology  [address]  [facility] [address]  Phone: [phone]  Fax: [phone]                  Rosaura David   3/21/2017 10:20 AM   Appointment    Description:  Female : 1950   Provider:  Leanna Bangura MD   Department:  [facility]                To Do List           Future Appointments        Provider Department Dept Phone    3/21/2017 10:20 AM Leanna Bangura MD [facility] [phone]    3/21/2017 11:30 AM CHAIR 08, ST [facility] CHEMO [facility] [phone]    2017 2:30 PM Brock Louis MD [facility] Ophthalmology [phone]    2017 8:30 AM LAB, ST [facility] DRAW STATION [facility] Laboratory [phone]    2017 9:00 AM Leanna Bangura MD [facility] [phone]      Goals (5 Years of Data)     None      [facility]     [facility] -  Assistance  Registered nurses in the [facility] provide clinical advisement, health education, appointment booking, and other advisory services.  Call for this free service at [phone].             Medications           Message regarding Medications     Verify the changes and/or additions to your medication regime listed below are the same as discussed with your clinician today.  If any of these changes or additions are incorrect, please notify your healthcare provider.             Verify that the below list of medications is an accurate representation of the medications you are currently taking.  If none reported, the list may be blank. If incorrect, please contact your healthcare provider. Carry this list with you in case of emergency.           Current Medications     alprazolam (XANAX) 1 MG tablet Take 1 tablet (1 mg total) by mouth nightly as needed for Insomnia.    amlodipine-benazepril 5-20 mg (LOTREL) 5-20 mg per capsule TAKE 1 CAPSULE EVERY DAY    azelastine (ASTELIN) 137 mcg nasal spray 1 spray (137  mcg total) by Nasal route 2 (two) times daily as needed.    calcium-vitamin D 500-125 mg-unit tablet Take 1 tablet by mouth 2 (two) times daily.     fexofenadine (ALLEGRA) 30 MG tablet Take 30 mg by mouth as needed.     fluticasone (FLONASE) 50 mcg/actuation nasal spray     hydrochlorothiazide (HYDRODIURIL) 25 MG tablet Take 1 tablet (25 mg total) by mouth once daily.    lidocaine-prilocaine (EMLA) cream Apply to affected area once    meloxicam (MOBIC) 7.5 MG tablet     pantoprazole (PROTONIX) 40 MG tablet     prochlorperazine (COMPAZINE) 10 MG tablet Take 1 tablet (10 mg total) by mouth every 6 (six) hours as needed.    thyroid, pork, (WP THYROID) 81.25 mg Tab Take 81.25 mg by mouth once daily.           Clinical Reference Information           Allergies as of 3/21/2017     Sulfa (Sulfonamide Antibiotics)    Adhesive      Immunizations Administered on Date of Encounter - 3/21/2017     None      Language Assistance Services     ATTENTION: Language assistance services are available, free of charge. Please call [phone].      ATENCIÓN: Si vincent barnes, tiene a rojo disposición servicios gratuitos de asistencia lingüística. Llame al [phone].     [facility] Ý: N?u b?n nói Ti?ng Vi?t, có các d?ch v? h? tr? ngôn ng? mi?n phí dành cho b?n. G?i s? [phone].         [facility] complies with applicable Federal civil rights laws and does not discriminate on the basis of race, color, national origin, age, disability, or sex.

## 2017-03-22 ENCOUNTER — INFUSION (OUTPATIENT)
Dept: INFUSION THERAPY | Facility: HOSPITAL | Age: 67
End: 2017-03-22
Attending: INTERNAL MEDICINE
Payer: MEDICARE

## 2017-03-22 ENCOUNTER — OFFICE VISIT (OUTPATIENT)
Dept: OTOLARYNGOLOGY | Facility: CLINIC | Age: 67
End: 2017-03-22
Payer: MEDICARE

## 2017-03-22 VITALS
DIASTOLIC BLOOD PRESSURE: 70 MMHG | HEART RATE: 85 BPM | RESPIRATION RATE: 16 BRPM | TEMPERATURE: 99 F | SYSTOLIC BLOOD PRESSURE: 111 MMHG

## 2017-03-22 VITALS
SYSTOLIC BLOOD PRESSURE: 135 MMHG | BODY MASS INDEX: 33.34 KG/M2 | WEIGHT: 207.44 LBS | HEIGHT: 66 IN | HEART RATE: 94 BPM | DIASTOLIC BLOOD PRESSURE: 86 MMHG

## 2017-03-22 DIAGNOSIS — C79.9 METASTASIS FROM BREAST CANCER: ICD-10-CM

## 2017-03-22 DIAGNOSIS — R49.0 DYSPHONIA: ICD-10-CM

## 2017-03-22 DIAGNOSIS — R13.10 DYSPHAGIA, UNSPECIFIED TYPE: ICD-10-CM

## 2017-03-22 DIAGNOSIS — C50.011 MALIGNANT NEOPLASM INVOLVING BOTH NIPPLE AND AREOLA OF RIGHT BREAST IN FEMALE: ICD-10-CM

## 2017-03-22 DIAGNOSIS — C50.919 CARCINOMA OF BREAST METASTATIC TO BRAIN, UNSPECIFIED LATERALITY: Primary | ICD-10-CM

## 2017-03-22 DIAGNOSIS — J38.01 PARALYSIS OF LEFT VOCAL FOLD: Primary | ICD-10-CM

## 2017-03-22 DIAGNOSIS — C79.31 CARCINOMA OF BREAST METASTATIC TO BRAIN, UNSPECIFIED LATERALITY: Primary | ICD-10-CM

## 2017-03-22 DIAGNOSIS — C50.919 METASTASIS FROM BREAST CANCER: ICD-10-CM

## 2017-03-22 PROCEDURE — 3079F DIAST BP 80-89 MM HG: CPT | Mod: S$GLB,,, | Performed by: NURSE PRACTITIONER

## 2017-03-22 PROCEDURE — 96372 THER/PROPH/DIAG INJ SC/IM: CPT | Mod: PN

## 2017-03-22 PROCEDURE — 1160F RVW MEDS BY RX/DR IN RCRD: CPT | Mod: S$GLB,,, | Performed by: NURSE PRACTITIONER

## 2017-03-22 PROCEDURE — 99999 PR PBB SHADOW E&M-EST. PATIENT-LVL V: CPT | Mod: PBBFAC,,, | Performed by: NURSE PRACTITIONER

## 2017-03-22 PROCEDURE — 3075F SYST BP GE 130 - 139MM HG: CPT | Mod: S$GLB,,, | Performed by: NURSE PRACTITIONER

## 2017-03-22 PROCEDURE — 1126F AMNT PAIN NOTED NONE PRSNT: CPT | Mod: S$GLB,,, | Performed by: NURSE PRACTITIONER

## 2017-03-22 PROCEDURE — 1157F ADVNC CARE PLAN IN RCRD: CPT | Mod: S$GLB,,, | Performed by: NURSE PRACTITIONER

## 2017-03-22 PROCEDURE — 63600175 PHARM REV CODE 636 W HCPCS: Mod: PN | Performed by: INTERNAL MEDICINE

## 2017-03-22 PROCEDURE — 1159F MED LIST DOCD IN RCRD: CPT | Mod: S$GLB,,, | Performed by: NURSE PRACTITIONER

## 2017-03-22 PROCEDURE — 99204 OFFICE O/P NEW MOD 45 MIN: CPT | Mod: 25,S$GLB,, | Performed by: NURSE PRACTITIONER

## 2017-03-22 PROCEDURE — 31575 DIAGNOSTIC LARYNGOSCOPY: CPT | Mod: S$GLB,,, | Performed by: NURSE PRACTITIONER

## 2017-03-22 RX ADMIN — PEGFILGRASTIM 6 MG: 6 INJECTION SUBCUTANEOUS at 10:03

## 2017-03-22 NOTE — PLAN OF CARE
Problem: Fall Risk (Adult)  Goal: Absence of Falls  Patient will demonstrate the desired outcomes by discharge/transition of care.   Outcome: Ongoing (interventions implemented as appropriate)  Verbalized understanding-- uses wheelchair

## 2017-03-22 NOTE — PROGRESS NOTES
Subjective:       Patient ID: Rosaura David is a 66 y.o. female.    Chief Complaint: No chief complaint on file.    HPI   Patient is referred by her oncologist Dr. Bangura for dysphonia, who is managing her metastatic breast CA with chemo infusion. Patient reports hoarseness X 2 weeks, suspects may be sinus related due to constant throat clearing. She is having trouble swallowing, even her pills, so she underwent esophageal dilatation on Friday for dysphagia, but states there has been no improvement in her dysphagia. She states she has been on a liquid diet X 2 weeks.    Review of Systems   Constitutional: Negative.    HENT: Positive for trouble swallowing and voice change.    Eyes: Negative.    Respiratory: Positive for cough and choking.    Cardiovascular: Negative.    Gastrointestinal: Negative.    Musculoskeletal: Negative.    Skin: Negative.    Neurological: Negative.    Hematological: Negative.    Psychiatric/Behavioral: Negative.        Objective:      Physical Exam   Constitutional: She is oriented to person, place, and time. Vital signs are normal. She appears well-developed and well-nourished. She is cooperative. She does not appear ill. No distress.   HENT:   Head: Normocephalic and atraumatic.   Right Ear: Hearing, tympanic membrane, external ear and ear canal normal. Tympanic membrane is not erythematous. No middle ear effusion.   Left Ear: Hearing, external ear and ear canal normal. Tympanic membrane is not erythematous. A middle ear effusion (serous, non-suppurative) is present.   Nose: Nose normal. No mucosal edema or rhinorrhea. Right sinus exhibits no maxillary sinus tenderness and no frontal sinus tenderness. Left sinus exhibits no maxillary sinus tenderness and no frontal sinus tenderness.   Mouth/Throat: Uvula is midline, oropharynx is clear and moist and mucous membranes are normal. Mucous membranes are not pale, not dry and not cyanotic. No oral lesions. No oropharyngeal exudate, posterior  oropharyngeal edema or posterior oropharyngeal erythema.   Eyes: Conjunctivae, EOM and lids are normal. Pupils are equal, round, and reactive to light. Right eye exhibits no discharge. Left eye exhibits no discharge. No scleral icterus.   Neck: Trachea normal and normal range of motion. Neck supple. No tracheal deviation present. No thyroid mass and no thyromegaly present.   Cardiovascular: Normal rate.    Pulmonary/Chest: Effort normal. No stridor. No respiratory distress. She has no wheezes.   Musculoskeletal: Normal range of motion.   Lymphadenopathy:        Head (right side): No submental, no submandibular, no tonsillar, no preauricular and no posterior auricular adenopathy present.        Head (left side): No submental, no submandibular, no tonsillar, no preauricular and no posterior auricular adenopathy present.     She has no cervical adenopathy.        Right cervical: No superficial cervical and no posterior cervical adenopathy present.       Left cervical: No superficial cervical and no posterior cervical adenopathy present.   Neurological: She is alert and oriented to person, place, and time. She has normal strength. Coordination and gait normal.   Skin: Skin is warm, dry and intact. No lesion and no rash noted. She is not diaphoretic. No cyanosis. No pallor.   Psychiatric: She has a normal mood and affect. Her speech is normal and behavior is normal. Judgment and thought content normal. Cognition and memory are normal.   Nursing note and vitals reviewed.      Procedure: Flexible laryngoscopy    In order to fully examine the upper aerodigestive tract, including the larynx, in a patient with a hyperactive gag reflex, and suboptimal visualization with indirect mirror exam,  flexible endoscopy is required.   After explaining the procedure and obtaining verbal consent, a timeout was performed with the patient's participation according to the universal protocol. Both nasal cavities were anesthetized with 4%  Xylocaine spray mixed with Zeyad-Synephrine. The flexible laryngoscope  was inserted into the nasal cavity and advanced to visualize the nasal cavity, nasopharynx, the posterior oropharynx, hypopharynx, and the endolarynx with the  findings noted. The scope was removed and the procedure terminated. The patient tolerated this procedure well without apparent complication.     OVERALL FINDINGS  Nasopharynx - the torus is clear. There are no lesions of the posterior wall.   Oropharynx - no lesions of the tongue base. There is no obvious fullness or asymmetry.  Hypopharynx - there are no lesions of the pyriform sinuses or postcricoid region   Larynx - there are no lesions of the supraglottic or glottic larynx.  Vocal fold mobility is normal.     SPECIFIC FINDINGS  Adenoid tissue - normal   Nasopharynx & eustachian tube orifices - normal   Posterior pharyngeal wall - normal   Base of tongue - normal   Epiglottis - normal   Valleculae - normal   Pyriform sinuses - normal   False vocal cords - normal   True vocal cords - left paralysis  Arytenoids - normal   Interarytenoid space - erythema   Left vocal cord paralysis    Larynx    Assessment:     Metastatic breast CA    Left TVC paralysis  Dysphonia  Dysphagia  Plan:     CT head/neck/chest w/contrast to check integrity of left recurrent laryngeal nerve    Will notify patient of results as soon as available

## 2017-03-22 NOTE — PLAN OF CARE
Problem: Patient Care Overview  Goal: Plan of Care Review  Outcome: Ongoing (interventions implemented as appropriate)  Verbalized understanding

## 2017-03-22 NOTE — LETTER
March 22, 2017      Leanna Bangura MD  [address] [facility] [address] [place] [address]           [facility]  [address] [facility] [address] [place] [address]  Phone: [phone]  Fax: [phone]          Patient: Rosaura David   MR Number: [id number]   YOB: 1950   Date of Visit: 3/22/2017       Dear Dr. Leanna Bangura:    Thank you for referring Rosaura David to me for evaluation. Attached you will find relevant portions of my assessment and plan of care.    If you have questions, please do not hesitate to call me. I look forward to following Rosaura David along with you.    Sincerely,    Chandrika iL, MECHELLE    Enclosure  CC:  No Recipients    If you would like to receive this communication electronically, please contact [email or web address] or [phone] to request more information on [facility] Link access.    For providers and/or their staff who would like to refer a patient to [facility], please contact us through our one-stop-shop provider referral line, [facility] , at [phone].    If you feel you have received this communication in error or would no longer like to receive these types of communications, please e-mail [email or web address]

## 2017-03-23 ENCOUNTER — TELEPHONE (OUTPATIENT)
Dept: ENDOCRINOLOGY | Facility: CLINIC | Age: 67
End: 2017-03-23

## 2017-03-23 NOTE — TELEPHONE ENCOUNTER
The patient was notified that [facility] stated her WP Thyroid medication is on back order. The patient was already aware of this. She is going to ask [facility] to get it from another pharmacy if possible. She does not want a different rx. And she still has medication at home.

## 2017-03-24 ENCOUNTER — HOSPITAL ENCOUNTER (OUTPATIENT)
Dept: RADIOLOGY | Facility: HOSPITAL | Age: 67
Discharge: HOME | End: 2017-03-24
Attending: NURSE PRACTITIONER
Payer: MEDICARE

## 2017-03-24 ENCOUNTER — PATIENT MESSAGE (OUTPATIENT)
Dept: HEMATOLOGY/ONCOLOGY | Facility: CLINIC | Age: 67
End: 2017-03-24

## 2017-03-24 ENCOUNTER — PATIENT MESSAGE (OUTPATIENT)
Dept: OTOLARYNGOLOGY | Facility: CLINIC | Age: 67
End: 2017-03-24

## 2017-03-24 DIAGNOSIS — J38.01 PARALYSIS OF LEFT VOCAL FOLD: ICD-10-CM

## 2017-03-24 DIAGNOSIS — C50.919 METASTASIS FROM BREAST CANCER: ICD-10-CM

## 2017-03-24 DIAGNOSIS — C79.9 METASTASIS FROM BREAST CANCER: ICD-10-CM

## 2017-03-24 PROCEDURE — 70491 CT SOFT TISSUE NECK W/DYE: CPT | Mod: TC,PO

## 2017-03-24 PROCEDURE — 71260 CT THORAX DX C+: CPT | Mod: TC,PO

## 2017-03-24 PROCEDURE — 71260 CT THORAX DX C+: CPT | Mod: 26,,, | Performed by: RADIOLOGY

## 2017-03-24 PROCEDURE — 25500020 PHARM REV CODE 255: Mod: PO | Performed by: NURSE PRACTITIONER

## 2017-03-24 PROCEDURE — 70470 CT HEAD/BRAIN W/O & W/DYE: CPT | Mod: TC,PO

## 2017-03-24 PROCEDURE — 70491 CT SOFT TISSUE NECK W/DYE: CPT | Mod: 26,,, | Performed by: RADIOLOGY

## 2017-03-24 PROCEDURE — 70470 CT HEAD/BRAIN W/O & W/DYE: CPT | Mod: 26,,, | Performed by: RADIOLOGY

## 2017-03-24 RX ADMIN — IOHEXOL 60 ML: 350 INJECTION, SOLUTION INTRAVENOUS at 08:03

## 2017-03-24 RX ADMIN — IOHEXOL 75 ML: 350 INJECTION, SOLUTION INTRAVENOUS at 08:03

## 2017-03-27 ENCOUNTER — PATIENT MESSAGE (OUTPATIENT)
Dept: OTOLARYNGOLOGY | Facility: CLINIC | Age: 67
End: 2017-03-27

## 2017-03-27 DIAGNOSIS — J38.01 UNILATERAL COMPLETE PARALYSIS OF VOCAL CORD: Primary | ICD-10-CM

## 2017-04-06 ENCOUNTER — OFFICE VISIT (OUTPATIENT)
Dept: OPHTHALMOLOGY | Facility: CLINIC | Age: 67
End: 2017-04-06
Payer: MEDICARE

## 2017-04-06 DIAGNOSIS — C79.31 BRAIN METASTASES: ICD-10-CM

## 2017-04-06 DIAGNOSIS — C79.31 CARCINOMA OF BREAST METASTATIC TO BRAIN, UNSPECIFIED LATERALITY: ICD-10-CM

## 2017-04-06 DIAGNOSIS — H02.433: ICD-10-CM

## 2017-04-06 DIAGNOSIS — H51.0 PARINAUD'S SYNDROME AFFECTING BOTH EYES: ICD-10-CM

## 2017-04-06 DIAGNOSIS — G91.1 OBSTRUCTIVE HYDROCEPHALUS: Primary | ICD-10-CM

## 2017-04-06 DIAGNOSIS — C50.919 CARCINOMA OF BREAST METASTATIC TO BRAIN, UNSPECIFIED LATERALITY: ICD-10-CM

## 2017-04-06 PROCEDURE — 1157F ADVNC CARE PLAN IN RCRD: CPT | Mod: S$GLB,,, | Performed by: OPHTHALMOLOGY

## 2017-04-06 PROCEDURE — 1159F MED LIST DOCD IN RCRD: CPT | Mod: S$GLB,,, | Performed by: OPHTHALMOLOGY

## 2017-04-06 PROCEDURE — 1160F RVW MEDS BY RX/DR IN RCRD: CPT | Mod: S$GLB,,, | Performed by: OPHTHALMOLOGY

## 2017-04-06 PROCEDURE — 99213 OFFICE O/P EST LOW 20 MIN: CPT | Mod: S$GLB,,, | Performed by: OPHTHALMOLOGY

## 2017-04-06 PROCEDURE — 99999 PR PBB SHADOW E&M-EST. PATIENT-LVL II: CPT | Mod: PBBFAC,,, | Performed by: OPHTHALMOLOGY

## 2017-04-06 NOTE — PATIENT INSTRUCTIONS
Return in one year.  Contact Dr. Valentina Lujan's staff [phone] on [facility] if you wish to learn more about eyelid surgery.

## 2017-04-06 NOTE — MR AVS SNAPSHOT
[facility]  [address] [facility] [address]  [place] [address]  Phone: [phone]  Fax: [phone]                  Rosaura David   2017 2:30 PM   Office Visit    Description:  Female : 1950   Provider:  Brock Louis MD   Department:  [facility]           Reason for Visit     Follow-up           Diagnoses this Visit        Comments    Obstructive hydrocephalus    -  Primary     Brain metastases         Carcinoma of breast metastatic to brain, unspecified laterality         Parinaud's syndrome affecting both eyes         Acquired paralytic ptosis of both eyelids                To Do List           Future Appointments        Provider Department Dept Phone    2017 9:05 AM LABORATORY, [place] [facility] [phone]    2017 9:20 AM Baudilio Ba MD [facility] [phone]    2017 9:00 AM Leanna Bangura MD [facility] - Hematology [phone]    2017 10:30 AM CHAIR 21, [facility] CHEMO [facility] [phone]    2017 1:30 PM CHAIR 12, [facility] [phone]      Goals (5 Years of Data)     None      Follow-Up and Disposition     Return in about 1 year (around 2018).      [facility]     [facility] Nurse Care Line -  Assistance  Unless otherwise directed by your provider, please contact [facility], our nurse care line that is available for  assistance.     Registered nurses in the [facility] provide: appointment scheduling, clinical advisement, health education, and other advisory services.  Call: [phone] (toll free)               Medications           Message regarding Medications     Verify the changes and/or additions to your medication regime listed below are the same as discussed with your clinician today.  If any of these changes or additions are incorrect, please notify your healthcare provider.              Verify that the below list of medications is an accurate representation of the medications you are currently taking.  If none reported, the list may be blank. If incorrect, please contact your healthcare provider. Carry this list with you in case of emergency.           Current Medications     alprazolam (XANAX) 1 MG tablet Take 1 tablet (1 mg total) by mouth nightly as needed for Insomnia.    amlodipine-benazepril 5-20 mg (LOTREL) 5-20 mg per capsule TAKE 1 CAPSULE EVERY DAY    azelastine (ASTELIN) 137 mcg nasal spray 1 spray (137 mcg total) by Nasal route 2 (two) times daily as needed.    calcium-vitamin D 500-125 mg-unit tablet Take 1 tablet by mouth 2 (two) times daily.     fexofenadine (ALLEGRA) 30 MG tablet Take 30 mg by mouth as needed.     fluticasone (FLONASE) 50 mcg/actuation nasal spray     hydrochlorothiazide (HYDRODIURIL) 25 MG tablet Take 1 tablet (25 mg total) by mouth once daily.    lidocaine-prilocaine (EMLA) cream Apply to affected area once    meloxicam (MOBIC) 7.5 MG tablet as needed.     ondansetron (ZOFRAN-ODT) 8 MG TbDL Take 1 tablet (8 mg total) by mouth every 12 (twelve) hours as needed.    pantoprazole (PROTONIX) 40 MG tablet Take 40 mg by mouth once daily.     prochlorperazine (COMPAZINE) 10 MG tablet Take 1 tablet (10 mg total) by mouth every 6 (six) hours as needed.    thyroid, pork, (WP THYROID) 81.25 mg Tab Take 81.25 mg by mouth once daily.           Clinical Reference Information           Allergies as of 4/6/2017     Sulfa (Sulfonamide Antibiotics)    Adhesive      Immunizations Administered on Date of Encounter - 4/6/2017     None      Instructions    Return in one year.  Contact Dr. Valentina Lujan's staff [phone] on [facility] if you wish to learn more about eyelid surgery.       Language Assistance Services     ATTENTION: Language assistance services are available, free of charge. Please call [phone].      ATENCIÓN: Si vincent barnes, saida a rojo disposición  servicios gratuitos de asistencia lingüística. Dolores arora [phone].     [facility] Ý: N?u b?n nói Ti?ng Vi?t, có các d?ch v? h? tr? ngôn ng? mi?n phí dành cho b?n. G?i s? [phone].         [facility] complies with applicable Federal civil rights laws and does not discriminate on the basis of race, color, national origin, age, disability, or sex.

## 2017-04-06 NOTE — PROGRESS NOTES
HPI     Follow-up    Additional comments: 2 month f/u for brain metastases           Comments   DLS: 2/2/17    Pt states still having trouble focusing OU. OD seems to be dominant now.   Still can't keep eyes open.      I have personally interviewed the patient, reviewed the history and   examined the patient and agree with the technician's exam.           Last edited by Brock Louis MD on 4/6/2017  2:35 PM. (History)        ROS     Positive for: Neurological, Genitourinary, Musculoskeletal, Endocrine,   Cardiovascular, Eyes    Negative for: Constitutional, Gastrointestinal, Skin, HENT, Respiratory,   Psychiatric, Allergic/Imm, Heme/Lymph    Last edited by Brock Louis MD on 4/6/2017  2:35 PM. (History)        Assessment /Plan     For exam results, see Encounter Report.    Obstructive hydrocephalus    Brain metastases    Carcinoma of breast metastatic to brain, unspecified laterality    Parinaud's syndrome affecting both eyes    Acquired paralytic ptosis of both eyelids      I explained the possibility of ptosis repair for her left eye to allow her to see to drive. She will consider this option and call if she wishes to look into it further. I will repeat her exam in one year or sooner if requested.

## 2017-04-07 ENCOUNTER — INITIAL CONSULT (OUTPATIENT)
Dept: OTOLARYNGOLOGY | Facility: CLINIC | Age: 67
End: 2017-04-07
Payer: MEDICARE

## 2017-04-07 ENCOUNTER — LAB VISIT (OUTPATIENT)
Dept: LAB | Facility: HOSPITAL | Age: 67
End: 2017-04-07
Attending: INTERNAL MEDICINE
Payer: MEDICARE

## 2017-04-07 VITALS
BODY MASS INDEX: 33.31 KG/M2 | SYSTOLIC BLOOD PRESSURE: 124 MMHG | TEMPERATURE: 96 F | WEIGHT: 206.38 LBS | HEART RATE: 94 BPM | DIASTOLIC BLOOD PRESSURE: 84 MMHG

## 2017-04-07 DIAGNOSIS — J38.01 UNILATERAL COMPLETE VOCAL FOLD PARALYSIS: ICD-10-CM

## 2017-04-07 DIAGNOSIS — C50.011 MALIGNANT NEOPLASM INVOLVING BOTH NIPPLE AND AREOLA OF RIGHT BREAST IN FEMALE: ICD-10-CM

## 2017-04-07 DIAGNOSIS — R49.9 VOICE DISTURBANCE: Primary | ICD-10-CM

## 2017-04-07 DIAGNOSIS — R13.14 DYSPHAGIA, PHARYNGOESOPHAGEAL: ICD-10-CM

## 2017-04-07 LAB
ALBUMIN SERPL BCP-MCNC: 3.8 G/DL
ALP SERPL-CCNC: 74 U/L
ALT SERPL W/O P-5'-P-CCNC: 38 U/L
ANION GAP SERPL CALC-SCNC: 10 MMOL/L
AST SERPL-CCNC: 26 U/L
BASOPHILS # BLD AUTO: 0.04 K/UL
BASOPHILS NFR BLD: 0.8 %
BILIRUB SERPL-MCNC: 0.9 MG/DL
BUN SERPL-MCNC: 13 MG/DL
CALCIUM SERPL-MCNC: 10 MG/DL
CHLORIDE SERPL-SCNC: 107 MMOL/L
CO2 SERPL-SCNC: 26 MMOL/L
CREAT SERPL-MCNC: 1 MG/DL
DIFFERENTIAL METHOD: NORMAL
EOSINOPHIL # BLD AUTO: 0.1 K/UL
EOSINOPHIL NFR BLD: 1.7 %
ERYTHROCYTE [DISTWIDTH] IN BLOOD BY AUTOMATED COUNT: 14.4 %
EST. GFR  (AFRICAN AMERICAN): >60 ML/MIN/1.73 M^2
EST. GFR  (NON AFRICAN AMERICAN): 58.8 ML/MIN/1.73 M^2
GLUCOSE SERPL-MCNC: 124 MG/DL
HCT VFR BLD AUTO: 39.5 %
HGB BLD-MCNC: 13.6 G/DL
LYMPHOCYTES # BLD AUTO: 1 K/UL
LYMPHOCYTES NFR BLD: 18.9 %
MCH RBC QN AUTO: 28.3 PG
MCHC RBC AUTO-ENTMCNC: 34.4 %
MCV RBC AUTO: 82 FL
MONOCYTES # BLD AUTO: 0.7 K/UL
MONOCYTES NFR BLD: 13.5 %
NEUTROPHILS # BLD AUTO: 3.4 K/UL
NEUTROPHILS NFR BLD: 64.9 %
PLATELET # BLD AUTO: 202 K/UL
PMV BLD AUTO: 9.4 FL
POTASSIUM SERPL-SCNC: 4.5 MMOL/L
PROT SERPL-MCNC: 7 G/DL
RBC # BLD AUTO: 4.81 M/UL
SODIUM SERPL-SCNC: 143 MMOL/L
WBC # BLD AUTO: 5.19 K/UL

## 2017-04-07 PROCEDURE — 1159F MED LIST DOCD IN RCRD: CPT | Mod: S$GLB,,, | Performed by: OTOLARYNGOLOGY

## 2017-04-07 PROCEDURE — 3079F DIAST BP 80-89 MM HG: CPT | Mod: S$GLB,,, | Performed by: OTOLARYNGOLOGY

## 2017-04-07 PROCEDURE — 99999 PR PBB SHADOW E&M-EST. PATIENT-LVL III: CPT | Mod: PBBFAC,,, | Performed by: OTOLARYNGOLOGY

## 2017-04-07 PROCEDURE — 1157F ADVNC CARE PLAN IN RCRD: CPT | Mod: S$GLB,,, | Performed by: OTOLARYNGOLOGY

## 2017-04-07 PROCEDURE — 80053 COMPREHEN METABOLIC PANEL: CPT

## 2017-04-07 PROCEDURE — 31579 LARYNGOSCOPY TELESCOPIC: CPT | Mod: S$GLB,,, | Performed by: OTOLARYNGOLOGY

## 2017-04-07 PROCEDURE — 85025 COMPLETE CBC W/AUTO DIFF WBC: CPT

## 2017-04-07 PROCEDURE — 1126F AMNT PAIN NOTED NONE PRSNT: CPT | Mod: S$GLB,,, | Performed by: OTOLARYNGOLOGY

## 2017-04-07 PROCEDURE — 1160F RVW MEDS BY RX/DR IN RCRD: CPT | Mod: S$GLB,,, | Performed by: OTOLARYNGOLOGY

## 2017-04-07 PROCEDURE — 36415 COLL VENOUS BLD VENIPUNCTURE: CPT

## 2017-04-07 PROCEDURE — 3074F SYST BP LT 130 MM HG: CPT | Mod: S$GLB,,, | Performed by: OTOLARYNGOLOGY

## 2017-04-07 PROCEDURE — 99214 OFFICE O/P EST MOD 30 MIN: CPT | Mod: 25,S$GLB,, | Performed by: OTOLARYNGOLOGY

## 2017-04-07 NOTE — Clinical Note
Injection augmentation Restylane-L Unilateral complete VF Paralysis  MBSS 1 week later  F/U 1 month later

## 2017-04-07 NOTE — PROGRESS NOTES
[facility]  Department of Otorhinolaryngology and Communication Sciences    Rosaura David is a 66 y.o. female who [facility] the [facility] for consultation at the kind request of Chandrika Li for further management of vocal fold paralysis.     She complains of hoarseness. Onset was gradual. Duration is 6 weeks. Time course is constant. Symptoms improved initially, but have now stabilized. She denies any exacerbating factors. She denies any alleviating factors. Associated symptoms include dysphagia to all consistencies. She endorses occasional mild obstructive dysphagia to both solids and pills, as well as occasional coughing when swallowing liquids. She underwent esophageal dilation about 3 weeks ago. This did not provide any benefit to her swallowing.  She has a history of metastatic breast cancer and underwent radiation in November, as well as gamma knife intervention in January. She has a history of a craniotomy and a  shunt.    Voice Handicap Index-10 (VHI-10) score is 38.   Reflux Symptom Index (RSI) score is 40.   Eating Assessment Tool-10 (EAT-10) score is 39.       Past Medical History  She has a past medical history of Arthritis; Brainstem tumor; Breast lesion; Cancer; HTN (hypertension); Malignant neoplasm of nipple of right breast; Murmur; Osteopenia; Presence of dental bridge; Seasonal allergies; Thyroid disease; and Wears glasses.    Past Surgical History  She has a past surgical history that includes Cholecystectomy (4/7/12); mass taken off of back; Colonoscopy (6/2012); Hysterectomy (1992); Portacath placement (Left); Breast surgery (Left); Mastectomy; CSF shunt; and Craniotomy.    Family History  Her family history includes Breast cancer in her sister; Cancer in her maternal grandmother; Colon cancer in her father; Congenital heart disease in her mother; Diabetes in her paternal grandfather; Heart disease in her mother; Macular degeneration in her brother. There is no history of  Amblyopia, Blindness, Cataracts, Glaucoma, Hypertension, Retinal detachment, Strabismus, Stroke, or Thyroid disease.    Social History  She reports that she has never smoked. She has never used smokeless tobacco. She reports that she does not drink alcohol or use illicit drugs.    Allergies  She is allergic to sulfa (sulfonamide antibiotics) and adhesive.    Medications  She has a current medication list which includes the following prescription(s): alprazolam, amlodipine-benazepril 5-20 mg, azelastine, calcium-vitamin d, fexofenadine, fluticasone, hydrochlorothiazide, lidocaine-prilocaine, meloxicam, ondansetron, pantoprazole, prochlorperazine, and thyroid (pork).    Review of Systems   Constitutional: Negative for fever.   HENT: Negative for sore throat.    Eyes: Positive for visual disturbance.   Respiratory: Positive for wheezing.    Cardiovascular: Negative for chest pain.   Gastrointestinal: Negative for nausea.   Musculoskeletal: Negative for arthralgias.   Skin: Negative for rash.   Neurological: Negative for tremors.   Hematological: Does not bruise/bleed easily.   Psychiatric/Behavioral: The patient is not nervous/anxious.           Objective:     /84  Pulse 94  Temp (!) 95.6 °F (35.3 °C)  Wt 93.6 kg (206 lb 5.6 oz)  BMI 33.31 kg/m2   Physical Exam    Constitutional: comfortable, well dressed  Psychiatric: appropriate affect  Respiratory: comfortably breathing, symmetric chest rise, no stridor  Voice: mild-moderate variable roughness with pulse register phonation and poor breath support  Cardiovascular: upper extremities non-edematous  Lymphatic: no cervical lymphadenopathy  Neurologic: alert and oriented to time, place, person, and situation; cranial nerves 2, 5, 7-8 grossly intact; disconjugate gaze with impaired upward rotation of the globes bilaterally; impaired left palatal elevation; slightly impaired left tongue strength; minimally impaired left shoulder elevation  Head:  normocephalic  Eyes: conjunctivae and sclerae clear  Ears: normal pinnae, normal external auditory canals, tympanic membranes intact  Nose: mucosa pink and noncongested, no masses, no mucopurulence, no polyps  Oral cavity / oropharynx: no mucosal lesions  Neck: soft, full range of motion, laryngotracheal complex palpable with appropriate landmarks, larynx elevates on swallowing  Indirect laryngoscopy: limited due to gag    Procedure  Flexible Laryngeal Videostroboscopy (15845): Laryngeal videostroboscopy is indicated to assess laryngeal vibratory biomechanics and vocal fold oscillation, which cannot be assessed with a plain light examination. This was carried out transnasally with a distal chip videoendoscope. After verbal consent was obtained, the patient was positioned and the nose was topically decongested with 1% phenylephrine and topically anesthetized with 4% lidocaine. The endoscope was passed through the most patent nasal cavity and positioned to image the nasopharynx, larynx, and hypopharynx in detail. The following featured were examined: nasopharyngeal, laryngeal, hypopharyngeal masses; velopharyngeal strength, closure, and symmetry of motion; vocal fold range and symmetry of motion; laryngeal mucosal edema, erythema, inflammation, and hydration; salivary pooling; and gross laryngeal sensation. During phonation, the vocal folds were assesses for glottal closure; mucosal wave; vocal fold lesions; vibratory periodicity, amplitude, and phase symmetry; and vertical height match. The equipment was removed. The patient tolerated the procedure well without complication. All findings were normal except:  - left vocal fold immobile, midline, decreased tone  - complete glottal closure, though with primarily aperiodic vibration  - concentric supraglottic laryngeal hyperfunction  - pooling in the pyriforms, primarily left sided  - mild left-sided velopharyngeal weakness, mild left-sided BOT weakness    Data  Reviewed    CT head/neck/chest 3/24/2017:   stable thyroid nodule (left, 3 cm, partially calcified).   stable cerebellopontine angle (CPA) mass (left, 1.5 cm).   fibrotic changes in your right lung      Assessment:     Rosaura David is a 66 y.o. female with left vocal fold immobility, potentially recoverable, related to metastatic breast cancer and the treatment thereof, in addition to pharyngoesophageal dysphagia.       Plan:        I had a discussion with the patient and her  regarding her condition and the further workup and management options.      I educated the patient on the prognosis of newly identified vocal fold immobility and emphasized that it may take up to a year for the nerve to demonstrate its full recovery potential. In the meantime, treatment options include the following: a) no treatment and continued observation; b) voice therapy; c) a vocal fold injection augmentation procedure. She desires procedural intervention. The risks and benefits of vocal fold injection augmentation were discussed with the patient. Risks included but were not limited to bleeding, infection, allergic reaction to the injectable, scarring, worsening of voice, early resorption, need for additional procedures, pain, epistaxis, and airway edema which could necessitate need for intubation or tracheostomy. We informed the patient that while in the past this procedure was performed mainly in the operating room under general anesthesia, we now primarily perform this procedure in our procedure suite without the need for general anesthesia.    All questions were answered and the patient would like to proceed with an office-based left vocal fold injection augmentation procedure. We will plan to use Restylane-L (hyaluronic acid). Informed consent was obtained. We will arrange this in the coming weeks. I recommend obtaining an MBSS about 1 week afterwards.    All questions were answered, and the patient is in agreement with the  above.     Baudilio Ba M.D.  [facility]  Department of Otorhinolaryngology and Communication Sciences

## 2017-04-07 NOTE — MR AVS SNAPSHOT
[facility]  [address]  [facility] [address]  Phone: [phone]  Fax: [phone]                  Rosaura David   2017 9:20 AM   Initial consult    Description:  Female : 1950   Provider:  Baudilio Ba MD   Department:  [facility]           Reason for Visit     left vocal cord paralyzed           Diagnoses this Visit        Comments    Voice disturbance    -  Primary     Dysphagia, pharyngoesophageal         Unilateral complete vocal fold paralysis                To Do List           Future Appointments        Provider Department Dept Phone    2017 10:30 AM LAB, HEMONC SAME DAY [facility] [phone]    2017 9:00 AM Leanna Bangura MD [facility] - Hematology [phone]    2017 10:30 AM CHAIR 21, [facility] CHEMO [facility] [phone]    2017 1:30 PM CHAIR 12, [facility] CHEMO [facility] [phone]    6/15/2017 9:00 AM [facility] MRI1 [facility] [phone]      Goals (5 Years of Data)     None      [facility]     [facility] Care [facility] -  Assistance  Unless otherwise directed by your provider, please contact [facility], our nurse care line that is available for  assistance.     Registered nurses in the [facility] provide: appointment scheduling, clinical advisement, health education, and other advisory services.  Call: [phone] (toll free)               Medications           Message regarding Medications     Verify the changes and/or additions to your medication regime listed below are the same as discussed with your clinician today.  If any of these changes or additions are incorrect, please notify your healthcare provider.             Verify that the below list of medications is an accurate representation of the medications you are currently taking.  If none reported, the list may be  blank. If incorrect, please contact your healthcare provider. Carry this list with you in case of emergency.           Current Medications     alprazolam (XANAX) 1 MG tablet Take 1 tablet (1 mg total) by mouth nightly as needed for Insomnia.    amlodipine-benazepril 5-20 mg (LOTREL) 5-20 mg per capsule TAKE 1 CAPSULE EVERY DAY    azelastine (ASTELIN) 137 mcg nasal spray 1 spray (137 mcg total) by Nasal route 2 (two) times daily as needed.    calcium-vitamin D 500-125 mg-unit tablet Take 1 tablet by mouth 2 (two) times daily.     fexofenadine (ALLEGRA) 30 MG tablet Take 30 mg by mouth as needed.     fluticasone (FLONASE) 50 mcg/actuation nasal spray     hydrochlorothiazide (HYDRODIURIL) 25 MG tablet Take 1 tablet (25 mg total) by mouth once daily.    lidocaine-prilocaine (EMLA) cream Apply to affected area once    meloxicam (MOBIC) 7.5 MG tablet as needed.     ondansetron (ZOFRAN-ODT) 8 MG TbDL Take 1 tablet (8 mg total) by mouth every 12 (twelve) hours as needed.    pantoprazole (PROTONIX) 40 MG tablet Take 40 mg by mouth once daily.     prochlorperazine (COMPAZINE) 10 MG tablet Take 1 tablet (10 mg total) by mouth every 6 (six) hours as needed.    thyroid, pork, (WP THYROID) 81.25 mg Tab Take 81.25 mg by mouth once daily.           Clinical Reference Information           Your Vitals Were     BP Pulse Temp Weight BMI    124/84 94 95.6 °F (35.3 °C) 93.6 kg (206 lb 5.6 oz) 33.31 kg/m2      Blood Pressure          Most Recent Value    BP  124/84      Allergies as of 4/7/2017     Sulfa (Sulfonamide Antibiotics)    Adhesive      Immunizations Administered on Date of Encounter - 4/7/2017     None      Orders Placed During Today's Visit     Future Labs/Procedures Expected by Expires    Fl Modified Barium Swallow Speech  4/7/2017 4/7/2018    SLP video swallow  As directed 4/7/2018      Instructions      VOCAL FOLD INJECTION AUGMENTATION     Description   If the vocal folds (vocal cords) cannot close completely, you may  experience voice problems: roughness, breathiness, inability to get loud, increased vocal effort, increased vocal fatigue. Some patients may also experience aspiration (coughing or choking with swallowing). Vocal fold injection augmentation plumps up the vocal fold and/or repositions it in the midline in order to help the vocal folds close completely while speaking or swallowing. Following the procedure, most patients experience a louder, stronger, clearer voice. The procedure also helps some patients protect against aspiration, although the swallowing improvement is not as dramatic as the voice improvement. We use the following materials for the procedure: hyaluronic acid (Restylane); carboxymethylcellulose (Radiesse Voice Gel); and calcium hydroxyapatite (Radiesse Voice). For most patients, the injection is performed with a small needle passed through the skin of the neck. However, in some patients we perform the injection with a device passed through the mouth. In either case, the injection is guided by the visualization provided by a scope passed through the nose.     What to expect during the procedure   We perform the injection in our office under local (topical) anesthesia. You are awake the whole time, and the entire procedure lasts about 15 minutes. Our primary focus is your safety and comfort. We usually make the larynx numb by spraying the throat and/or dripping anesthetic on the larynx. At this time, you may cough or gag, or you may have the sensation that you spilled some water down the wrong pipe. These are temporary sensations that allow us to get you numb. The numbing process takes about 2 minutes. We will not proceed until we know you will be as comfortable as possible. A small needle is passed either through the skin of the neck or via a long instrument through the mouth to perform the injection. During the injection, you may experience mild discomfort in the throat. You may feel an unusual  sensation in the ear because the larynx and the ear share the same sensory nerve. In rare cases in which a patient does not tolerate the procedure, it may be performed in the operating room, with the patient completely asleep under general anesthesia.     What to expect afterwards   Most patients note a stronger, less effortful voice immediately after the injection. Sometimes the voice is tight or pressed voice is noted right after the injection. The voice usually has good days and bad days and gradually improves until you reach your new baseline voice over the first 1-2 weeks. Voice therapy may be a necessary part of your treatment plan to optimize your vocal outcome. None of the materials we inject are permanent. As the material dissolves, you may experience a gradual worsening of voice quality over the course of several months. At this point we may consider repeating the injection. You may be a candidate for a permanent fix, which involves an open surgery in the neck performed in the operating room.     Instructions: before the procedure   1. Do not take aspirin-containing products or other medications that can thin the blood (such as ibuprofen, Advil, Motrin, Aleve, Plavix) for 7 days prior to the injection. If you take Coumadin (warfarin), you may need to stop using this a few days prior to the injection. If you are on blood thinning (anti-platelet or anti-coagulant) medication and it is not clear what you should do, please clarify this with your physician.   2. On the day of your procedure, please make sure you take your other regular morning medications.   3. On the day of your procedure, it is OK to eat and drink as you would normally, up until 3 hours before to your appointment time. During that time frame, we ask that you restrict yourself only to clear liquids. A clear liquid is anything you can see through (water, ginger ale, apple juice).     Instructions: after the procedure   1. Please refrain from  eating or drinking for 1 hour following the procedure. This allows time for the numbing medication to wear off.   2. Most patients experience very little pain. If necessary, most patients are able to keep comfortable with plain Tylenol (acetaminophen) and/or other non-steroidal anti-inflammatory medications such as ibuprofen (Advil, Motrin). Please follow package instructions if considering taking these medications.   3. In most instances, it is OK to use your voice immediately after the procedure. However, for the first week, you should avoid speaking over heavy background noise or in a very loud voice. It is rare, but in some cases you will be asked to rest your voice for the first 24 hours.   4. Please call the Voice Center at [phone] if   · You have a temperature above 101°F   · You develop Increasing throat pain not relieved by over-the-counter medications   · You have any other post-operative questions or concerns   5. Please go immediately to the nearest emergency room if you are experiencing   · Shortness of breath   · Difficulty breathing   · Difficulty swallowing   · Severe bleeding     FREQUENTLY ASKED QUESTIONS     Is this a Botox injection? No. Botox weakens the voice box muscles. Instead, with a vocal fold injection augmentation, we are injecting filler material to bulk up the vocal fold(s).     How do you decide which material to use for the injection? Our decision is based on the indication for the procedure, the position of the vocal fold, and other patient historical/anatomical factors. In some instances, the approval of your insurance company is an important factor.     How to you decide which technique to use (through the neck versus through the mouth)? Patient anatomy and the position of the vocal fold play an important role. Other patient factors such as gag reflex are also strongly considered.     Why do you perform this in your office instead of in the operating room? Performing the  procedure in the office is safe and precise. In addition, performing the injection with the patient awake gives us direct visual and auditory feedback, which we do not get when the patient is asleep in the operating room. Furthermore, an office-based injection is much less time consuming, is more convenient for the patient, and does not involve the risks or the recovery time associated with general anesthesia. We can still do this in the operating room; we save that setting for specific diagnoses or situations, as well as for the rare patient who is unable to tolerate the awake procedure.     Why did I have discomfort in my ear (during or after the injection)? This is an example of referred pain. The ear and the larynx share the same sensory nerve.     I got an injection 3 days ago. Why is my voice still hoarse? To optimize vocal outcome, we overinject a little bit. Additionally, there may be a mild amount of swelling. Finally, the muscles of the larynx need to adjust to the injected material. Most people will have good days and bad days at first. After 1-2 weeks, you should settle out to your new baseline voice.     How long does the injection last? Carboxymethylcellulose (Radiesse Voice Gel) lasts approximately 1-2 months. Hyaluronic acid (Restylane) lasts approximately 4 months. Calcium hydroxyapatite (Radiesse Voice) lasts up to 1 year; however its characteristics are such that only few patients are appropriate for using this material.     Is there a permanent injectable material? No.     Can the injection be repeated? Yes. There is no limit to the number of times an injection can be repeated. However, a permanent surgical fix is often an option to consider.            Language Assistance Services     ATTENTION: Language assistance services are available, free of charge. Please call [phone].      ATENCIÓN: Si habla cameron, tiene a rojo disposición servicios gratuitos de asistencia lingüística. Llame al  [phone].     KATYA Ý: N?u b?n nói Ti?ng Vi?t, có các d?ch v? h? tr? ngôn ng? mi?n phí dành cho b?n. G?i s? [phone].         [facility] complies with applicable Federal civil rights laws and does not discriminate on the basis of race, color, national origin, age, disability, or sex.

## 2017-04-07 NOTE — PATIENT INSTRUCTIONS
VOCAL FOLD INJECTION AUGMENTATION     Description   If the vocal folds (vocal cords) cannot close completely, you may experience voice problems: roughness, breathiness, inability to get loud, increased vocal effort, increased vocal fatigue. Some patients may also experience aspiration (coughing or choking with swallowing). Vocal fold injection augmentation plumps up the vocal fold and/or repositions it in the midline in order to help the vocal folds close completely while speaking or swallowing. Following the procedure, most patients experience a louder, stronger, clearer voice. The procedure also helps some patients protect against aspiration, although the swallowing improvement is not as dramatic as the voice improvement. We use the following materials for the procedure: hyaluronic acid (Restylane); carboxymethylcellulose (Radiesse Voice Gel); and calcium hydroxyapatite (Radiesse Voice). For most patients, the injection is performed with a small needle passed through the skin of the neck. However, in some patients we perform the injection with a device passed through the mouth. In either case, the injection is guided by the visualization provided by a scope passed through the nose.     What to expect during the procedure   We perform the injection in our office under local (topical) anesthesia. You are awake the whole time, and the entire procedure lasts about 15 minutes. Our primary focus is your safety and comfort. We usually make the larynx numb by spraying the throat and/or dripping anesthetic on the larynx. At this time, you may cough or gag, or you may have the sensation that you spilled some water down the wrong pipe. These are temporary sensations that allow us to get you numb. The numbing process takes about 2 minutes. We will not proceed until we know you will be as comfortable as possible. A small needle is passed either through the skin of the neck or via a long instrument through the mouth to  perform the injection. During the injection, you may experience mild discomfort in the throat. You may feel an unusual sensation in the ear because the larynx and the ear share the same sensory nerve. In rare cases in which a patient does not tolerate the procedure, it may be performed in the operating room, with the patient completely asleep under general anesthesia.     What to expect afterwards   Most patients note a stronger, less effortful voice immediately after the injection. Sometimes the voice is tight or pressed voice is noted right after the injection. The voice usually has good days and bad days and gradually improves until you reach your new baseline voice over the first 1-2 weeks. Voice therapy may be a necessary part of your treatment plan to optimize your vocal outcome. None of the materials we inject are permanent. As the material dissolves, you may experience a gradual worsening of voice quality over the course of several months. At this point we may consider repeating the injection. You may be a candidate for a permanent fix, which involves an open surgery in the neck performed in the operating room.     Instructions: before the procedure   1. Do not take aspirin-containing products or other medications that can thin the blood (such as ibuprofen, Advil, Motrin, Aleve, Plavix) for 7 days prior to the injection. If you take Coumadin (warfarin), you may need to stop using this a few days prior to the injection. If you are on blood thinning (anti-platelet or anti-coagulant) medication and it is not clear what you should do, please clarify this with your physician.   2. On the day of your procedure, please make sure you take your other regular morning medications.   3. On the day of your procedure, it is OK to eat and drink as you would normally, up until 3 hours before to your appointment time. During that time frame, we ask that you restrict yourself only to clear liquids. A clear liquid is anything  you can see through (water, ginger ale, apple juice).     Instructions: after the procedure   1. Please refrain from eating or drinking for 1 hour following the procedure. This allows time for the numbing medication to wear off.   2. Most patients experience very little pain. If necessary, most patients are able to keep comfortable with plain Tylenol (acetaminophen) and/or other non-steroidal anti-inflammatory medications such as ibuprofen (Advil, Motrin). Please follow package instructions if considering taking these medications.   3. In most instances, it is OK to use your voice immediately after the procedure. However, for the first week, you should avoid speaking over heavy background noise or in a very loud voice. It is rare, but in some cases you will be asked to rest your voice for the first 24 hours.   4. Please call the Voice Center at [phone] if   · You have a temperature above 101°F   · You develop Increasing throat pain not relieved by over-the-counter medications   · You have any other post-operative questions or concerns   5. Please go immediately to the nearest emergency room if you are experiencing   · Shortness of breath   · Difficulty breathing   · Difficulty swallowing   · Severe bleeding     FREQUENTLY ASKED QUESTIONS     Is this a Botox injection? No. Botox weakens the voice box muscles. Instead, with a vocal fold injection augmentation, we are injecting filler material to bulk up the vocal fold(s).     How do you decide which material to use for the injection? Our decision is based on the indication for the procedure, the position of the vocal fold, and other patient historical/anatomical factors. In some instances, the approval of your insurance company is an important factor.     How to you decide which technique to use (through the neck versus through the mouth)? Patient anatomy and the position of the vocal fold play an important role. Other patient factors such as gag reflex are  also strongly considered.     Why do you perform this in your office instead of in the operating room? Performing the procedure in the office is safe and precise. In addition, performing the injection with the patient awake gives us direct visual and auditory feedback, which we do not get when the patient is asleep in the operating room. Furthermore, an office-based injection is much less time consuming, is more convenient for the patient, and does not involve the risks or the recovery time associated with general anesthesia. We can still do this in the operating room; we save that setting for specific diagnoses or situations, as well as for the rare patient who is unable to tolerate the awake procedure.     Why did I have discomfort in my ear (during or after the injection)? This is an example of referred pain. The ear and the larynx share the same sensory nerve.     I got an injection 3 days ago. Why is my voice still hoarse? To optimize vocal outcome, we overinject a little bit. Additionally, there may be a mild amount of swelling. Finally, the muscles of the larynx need to adjust to the injected material. Most people will have good days and bad days at first. After 1-2 weeks, you should settle out to your new baseline voice.     How long does the injection last? Carboxymethylcellulose (Radiesse Voice Gel) lasts approximately 1-2 months. Hyaluronic acid (Restylane) lasts approximately 4 months. Calcium hydroxyapatite (Radiesse Voice) lasts up to 1 year; however its characteristics are such that only few patients are appropriate for using this material.     Is there a permanent injectable material? No.     Can the injection be repeated? Yes. There is no limit to the number of times an injection can be repeated. However, a permanent surgical fix is often an option to consider.

## 2017-04-11 ENCOUNTER — INFUSION (OUTPATIENT)
Dept: INFUSION THERAPY | Facility: HOSPITAL | Age: 67
End: 2017-04-11
Attending: INTERNAL MEDICINE
Payer: MEDICARE

## 2017-04-11 ENCOUNTER — DOCUMENTATION ONLY (OUTPATIENT)
Dept: INFUSION THERAPY | Facility: HOSPITAL | Age: 67
End: 2017-04-11

## 2017-04-11 ENCOUNTER — OFFICE VISIT (OUTPATIENT)
Dept: HEMATOLOGY/ONCOLOGY | Facility: CLINIC | Age: 67
End: 2017-04-11
Payer: MEDICARE

## 2017-04-11 VITALS
RESPIRATION RATE: 17 BRPM | DIASTOLIC BLOOD PRESSURE: 78 MMHG | WEIGHT: 203.69 LBS | HEART RATE: 91 BPM | TEMPERATURE: 98 F | SYSTOLIC BLOOD PRESSURE: 116 MMHG | BODY MASS INDEX: 33.94 KG/M2 | HEIGHT: 65 IN

## 2017-04-11 VITALS — HEART RATE: 84 BPM | DIASTOLIC BLOOD PRESSURE: 84 MMHG | RESPIRATION RATE: 20 BRPM | SYSTOLIC BLOOD PRESSURE: 139 MMHG

## 2017-04-11 DIAGNOSIS — C50.919 CARCINOMA OF BREAST METASTATIC TO BRAIN, UNSPECIFIED LATERALITY: ICD-10-CM

## 2017-04-11 DIAGNOSIS — C50.919 CARCINOMA OF BREAST METASTATIC TO BRAIN, UNSPECIFIED LATERALITY: Primary | ICD-10-CM

## 2017-04-11 DIAGNOSIS — C79.31 CARCINOMA OF BREAST METASTATIC TO BRAIN, UNSPECIFIED LATERALITY: ICD-10-CM

## 2017-04-11 DIAGNOSIS — C50.011 MALIGNANT NEOPLASM INVOLVING BOTH NIPPLE AND AREOLA OF RIGHT BREAST IN FEMALE: Primary | ICD-10-CM

## 2017-04-11 DIAGNOSIS — C79.31 BRAIN METASTASES: ICD-10-CM

## 2017-04-11 DIAGNOSIS — C50.011 MALIGNANT NEOPLASM INVOLVING BOTH NIPPLE AND AREOLA OF RIGHT BREAST IN FEMALE: ICD-10-CM

## 2017-04-11 DIAGNOSIS — C79.31 CARCINOMA OF BREAST METASTATIC TO BRAIN, UNSPECIFIED LATERALITY: Primary | ICD-10-CM

## 2017-04-11 PROCEDURE — 1157F ADVNC CARE PLAN IN RCRD: CPT | Mod: S$GLB,,, | Performed by: INTERNAL MEDICINE

## 2017-04-11 PROCEDURE — 3078F DIAST BP <80 MM HG: CPT | Mod: S$GLB,,, | Performed by: INTERNAL MEDICINE

## 2017-04-11 PROCEDURE — 1159F MED LIST DOCD IN RCRD: CPT | Mod: S$GLB,,, | Performed by: INTERNAL MEDICINE

## 2017-04-11 PROCEDURE — 96377 APPLICATON ON-BODY INJECTOR: CPT | Mod: PN

## 2017-04-11 PROCEDURE — 3074F SYST BP LT 130 MM HG: CPT | Mod: S$GLB,,, | Performed by: INTERNAL MEDICINE

## 2017-04-11 PROCEDURE — 99215 OFFICE O/P EST HI 40 MIN: CPT | Mod: S$GLB,,, | Performed by: INTERNAL MEDICINE

## 2017-04-11 PROCEDURE — 99999 PR PBB SHADOW E&M-EST. PATIENT-LVL III: CPT | Mod: PBBFAC,,, | Performed by: INTERNAL MEDICINE

## 2017-04-11 PROCEDURE — 1126F AMNT PAIN NOTED NONE PRSNT: CPT | Mod: S$GLB,,, | Performed by: INTERNAL MEDICINE

## 2017-04-11 PROCEDURE — 1160F RVW MEDS BY RX/DR IN RCRD: CPT | Mod: S$GLB,,, | Performed by: INTERNAL MEDICINE

## 2017-04-11 PROCEDURE — 96415 CHEMO IV INFUSION ADDL HR: CPT | Mod: PN

## 2017-04-11 PROCEDURE — 63600175 PHARM REV CODE 636 W HCPCS: Mod: PN | Performed by: INTERNAL MEDICINE

## 2017-04-11 PROCEDURE — 25000003 PHARM REV CODE 250: Mod: PN | Performed by: INTERNAL MEDICINE

## 2017-04-11 PROCEDURE — 96367 TX/PROPH/DG ADDL SEQ IV INF: CPT | Mod: PN

## 2017-04-11 PROCEDURE — 96413 CHEMO IV INFUSION 1 HR: CPT | Mod: PN

## 2017-04-11 RX ORDER — FAMOTIDINE 20 MG/50ML
20 INJECTION, SOLUTION INTRAVENOUS
Status: CANCELLED
Start: 2017-04-11 | End: 2017-04-11

## 2017-04-11 RX ORDER — SODIUM CHLORIDE 0.9 % (FLUSH) 0.9 %
10 SYRINGE (ML) INJECTION
Status: DISCONTINUED | OUTPATIENT
Start: 2017-04-11 | End: 2017-04-11 | Stop reason: HOSPADM

## 2017-04-11 RX ORDER — FAMOTIDINE 20 MG/50ML
20 INJECTION, SOLUTION INTRAVENOUS
Status: COMPLETED | OUTPATIENT
Start: 2017-04-11 | End: 2017-04-11

## 2017-04-11 RX ORDER — HEPARIN 100 UNIT/ML
500 SYRINGE INTRAVENOUS
Status: DISCONTINUED | OUTPATIENT
Start: 2017-04-11 | End: 2017-04-11 | Stop reason: HOSPADM

## 2017-04-11 RX ORDER — SODIUM CHLORIDE 0.9 % (FLUSH) 0.9 %
10 SYRINGE (ML) INJECTION
Status: CANCELLED | OUTPATIENT
Start: 2017-04-11

## 2017-04-11 RX ORDER — HEPARIN 100 UNIT/ML
500 SYRINGE INTRAVENOUS
Status: CANCELLED | OUTPATIENT
Start: 2017-04-11

## 2017-04-11 RX ADMIN — DIPHENHYDRAMINE HYDROCHLORIDE 50 MG: 50 INJECTION, SOLUTION INTRAMUSCULAR; INTRAVENOUS at 10:04

## 2017-04-11 RX ADMIN — Medication 20 MG: at 10:04

## 2017-04-11 RX ADMIN — SODIUM CHLORIDE 16 MG: 9 INJECTION, SOLUTION INTRAVENOUS at 10:04

## 2017-04-11 RX ADMIN — SODIUM CHLORIDE: 0.9 INJECTION, SOLUTION INTRAVENOUS at 10:04

## 2017-04-11 RX ADMIN — PACLITAXEL 372 MG: 6 INJECTION, SOLUTION INTRAVENOUS at 11:04

## 2017-04-11 RX ADMIN — FAMOTIDINE 20 MG: 20 INJECTION, SOLUTION INTRAVENOUS at 10:04

## 2017-04-11 RX ADMIN — SODIUM CHLORIDE, PRESERVATIVE FREE 10 ML: 5 INJECTION INTRAVENOUS at 10:04

## 2017-04-11 RX ADMIN — HEPARIN 500 UNITS: 100 SYRINGE at 02:04

## 2017-04-11 RX ADMIN — PEGFILGRASTIM 6 MG: KIT SUBCUTANEOUS at 02:04

## 2017-04-11 NOTE — PROGRESS NOTES
Nutrition: Met with pt and pts  today while pt was having chemo infusion. Pt informed me that she is eating but having a hard time swallowing due to paralyzed vocal cord. Pt is going for an injection to help stimulate the muscle in effort to improve swallowing. Pt reports that her diet is starting to vary however she is still having a hard time getting foods down. States that she is sensitve to the temperature of foods as well. Wt: 203# Weight loss of 4#s since last week. Discussed the importance of weight maintenance. Discussed the improtance of eating small frequent meals. Discussed the importance of adding high calorie foods to diet. Discussed the importance of eaitng a protein rich diet. Provided pt with necessary handouts. Will follow up at cycle 3. Petra Rae,MS, RD, LDN

## 2017-04-11 NOTE — MR AVS SNAPSHOT
[facility]  [address]  [facility] [address]  Phone: [phone]  Fax: [phone]                  Rosaura David   2017 9:00 AM   Office Visit    Description:  Female : 1950   Provider:  Leanna Bangura MD   Department:  [facility]ematology           Diagnoses this Visit        Comments    Malignant neoplasm involving both nipple and areola of right breast in female    -  Primary            To Do List           Future Appointments        Provider Department Dept Phone    2017 10:30 AM CHAIR 27, [facility] CHEMO [facility] [phone]    2017 1:30 PM CHAIR 12, [facility] CHE[facility] [phone]    2017 2:20 PM Baudilio Ba MD [facility] [phone]    2017 2:30 PM [facility] [id number] [address] LB LIMIT [facility] [phone]    2017 2:30 PM Brittany Reynoso, [facility] [phone]      Goals (5 Years of Data)     None      [facility]     [facility] Care [facility] - / Assistance  Unless otherwise directed by your provider, please contact [facility], our nurse care line that is available for  assistance.     Registered nurses in the [facility] provide: appointment scheduling, clinical advisement, health education, and other advisory services.  Call: [phone] (toll free)               Medications           Message regarding Medications     Verify the changes and/or additions to your medication regime listed below are the same as discussed with your clinician today.  If any of these changes or additions are incorrect, please notify your healthcare provider.             Verify that the below list of medications is an accurate representation of the medications you are currently taking.  If none reported, the list may be blank. If incorrect, please contact your healthcare provider. Carry this list with  "you in case of emergency.           Current Medications     alprazolam (XANAX) 1 MG tablet Take 1 tablet (1 mg total) by mouth nightly as needed for Insomnia.    amlodipine-benazepril 5-20 mg (LOTREL) 5-20 mg per capsule TAKE 1 CAPSULE EVERY DAY    azelastine (ASTELIN) 137 mcg nasal spray 1 spray (137 mcg total) by Nasal route 2 (two) times daily as needed.    calcium-vitamin D 500-125 mg-unit tablet Take 1 tablet by mouth 2 (two) times daily.     fexofenadine (ALLEGRA) 30 MG tablet Take 30 mg by mouth as needed.     fluticasone (FLONASE) 50 mcg/actuation nasal spray     hydrochlorothiazide (HYDRODIURIL) 25 MG tablet Take 1 tablet (25 mg total) by mouth once daily.    lidocaine-prilocaine (EMLA) cream Apply to affected area once    meloxicam (MOBIC) 7.5 MG tablet as needed.     ondansetron (ZOFRAN-ODT) 8 MG TbDL Take 1 tablet (8 mg total) by mouth every 12 (twelve) hours as needed.    pantoprazole (PROTONIX) 40 MG tablet Take 40 mg by mouth once daily.     prochlorperazine (COMPAZINE) 10 MG tablet Take 1 tablet (10 mg total) by mouth every 6 (six) hours as needed.    thyroid, pork, (WP THYROID) 81.25 mg Tab Take 81.25 mg by mouth once daily.           Clinical Reference Information           Your Vitals Were     BP Pulse Temp Resp Height Weight    116/78 91 98.4 °F (36.9 °C) 17 5' 5" (1.651 m) 92.4 kg (203 lb 11.3 oz)    BMI                33.9 kg/m2          Blood Pressure          Most Recent Value    BP  116/78      Allergies as of 4/11/2017     Sulfa (Sulfonamide Antibiotics)    Adhesive      Immunizations Administered on Date of Encounter - 4/11/2017     None      Orders Placed During Today's Visit     Future Labs/Procedures Expected by Expires    CBC w/ DIFF  4/11/2017 6/10/2018    CMP  4/11/2017 6/10/2018      Language Assistance Services     ATTENTION: Language assistance services are available, free of charge. Please call [phone].      ATENCIÓN: Si saida madrigal a rojo disposición servicios " manasaos de asistencia lingüística. Dolores arora [phone].     KATYA Ý: N?u b?n nói Ti?ng Vi?t, có các d?ch v? h? tr? ngôn ng? mi?n phí dành cho b?n. G?i s? [phone].         [facility] complies with applicable Federal civil rights laws and does not discriminate on the basis of race, color, national origin, age, disability, or sex.

## 2017-04-11 NOTE — PROGRESS NOTES
Subjective:       Patient ID: Rosaura David is a 66 y.o. female.    Chief Complaint:met breast ca, s/p metastatectomy.   HPI:   A 66-year-old  woman, who I met during the summer of 2015. The patient  came in to see me in the Medical Oncology Clinic for neoadjuvant chemotherapy   for high-grade infiltrating right breast ductal carcinoma with hypermetabolic   lymph nodes along supraclavicular and subdural internal mammary and right   axillary lymph nodes, SUV of 17-18 were noted. She was triple negative at that   time. The patient received Adriamycin and Cytoxan chemotherapy x4 followed by   Taxotere x4. She had some side effects of neuropathy and at the completion, she  had marked improvement of the hypermetabolic breast mass that was reduced to   almost scar like area and all the lymph nodes that were lighting up in multiple   areas had disappeared. The patient then 09/20/2015, underwent four sentinel   lymph node evaluations that were negative for metastases and right breast   partial mastectomy, which showed benign breast tissue with small focus of fat   necrosis from previous biopsy site. No residual carcinoma was noted. The   patient was sent for Radiation Oncology evaluation opted not to have any   adjuvant radiation therapy and was undergoing surveillance. She had a followup   PET scan for surveillance done in October 2015, which showed recurrence with   interval development of extensive hypermetabolic lymphadenopathy within the   right axillary region, right subpectoral region consistent with metastatic   disease. The largest node in the axilla was 4.8 x 3.7 cm with a maximum SUV of   16.3 and the medial aspect of the right breast also had an irregular   hypermetabolic mass 4.9 x 3 cm with a maximum SUV of 4.6. No other metastatic   areas were found and after much debate, the patient reembarked on the same   Adriamycin and Cytoxan x4. At the completion of therapy, the patient showed   complete  clearing of all the disease noted in the lymph nodes and in the breast.  She had laser mastectomy under Dr. Atwood in [place], no residual dz in breast or lymph nodes on path report.   Pt also had the lipoma under the rt arm removed this was in [place].she then underwent adjuvamt xrt with DR. Santiago.  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   the new lesion that DR. Wong  did stereotactic sx at [facility]. New mass has also shown triple negativity   pt has discussed multiple rx options   She had dilatation done of her esophagus because of difficulty swallowing,sent her to ENt for eval of hoarseness, vocal cord paralysis noted and ref to voice rx at [facility].issues  Laboratory:     CBC:  Lab Results   Component Value Date    WBC 5.19 04/07/2017    RBC 4.81 04/07/2017    HGB 13.6 04/07/2017    HCT 39.5 04/07/2017    MCV 82 04/07/2017    MCH 28.3 04/07/2017    MCHC 34.4 04/07/2017    RDW 14.4 04/07/2017     04/07/2017    MPV 9.4 04/07/2017    GRAN 3.4 04/07/2017    GRAN 64.9 04/07/2017    LYMPH 1.0 04/07/2017    LYMPH 18.9 04/07/2017    MONO 0.7 04/07/2017    MONO 13.5 04/07/2017    EOS 0.1 04/07/2017    BASO 0.04 04/07/2017    EOSINOPHIL 1.7 04/07/2017    BASOPHIL 0.8 04/07/2017       BMP: BMP  Lab Results   Component Value Date     04/07/2017    K 4.5 04/07/2017     04/07/2017    CO2 26 04/07/2017    BUN 13 04/07/2017    CREATININE 1.0 04/07/2017    CALCIUM 10.0 04/07/2017    ANIONGAP 10 04/07/2017    ESTGFRAFRICA >60.0 04/07/2017    EGFRNONAA 58.8 (A) 04/07/2017       LFT:   Lab Results   Component Value Date    ALT 38 04/07/2017    AST 26 04/07/2017    ALKPHOS 74  04/07/2017    BILITOT 0.9 04/07/2017   PET 1/2017 no mets anywhere else      Assessment/Plan:       Tolerated taxol.   Did lose hair   had eyelid and vocal cord evaluation, going for injections to vocal cord on 20th   proceed with taxol, rtc 3 weeks with cbc, cmp

## 2017-04-12 ENCOUNTER — PATIENT MESSAGE (OUTPATIENT)
Dept: HEMATOLOGY/ONCOLOGY | Facility: CLINIC | Age: 67
End: 2017-04-12

## 2017-04-18 ENCOUNTER — PATIENT MESSAGE (OUTPATIENT)
Dept: HEMATOLOGY/ONCOLOGY | Facility: CLINIC | Age: 67
End: 2017-04-18

## 2017-04-21 ENCOUNTER — PROCEDURE VISIT (OUTPATIENT)
Dept: OTOLARYNGOLOGY | Facility: CLINIC | Age: 67
End: 2017-04-21
Payer: MEDICARE

## 2017-04-21 VITALS
HEART RATE: 110 BPM | SYSTOLIC BLOOD PRESSURE: 151 MMHG | TEMPERATURE: 98 F | DIASTOLIC BLOOD PRESSURE: 106 MMHG | RESPIRATION RATE: 20 BRPM

## 2017-04-21 DIAGNOSIS — R49.9 VOICE DISTURBANCE: ICD-10-CM

## 2017-04-21 DIAGNOSIS — J38.01 UNILATERAL COMPLETE VOCAL FOLD PARALYSIS: Primary | ICD-10-CM

## 2017-04-21 PROCEDURE — L8607 INJ VOCAL CORD BULKING AGENT: HCPCS | Mod: S$GLB,,, | Performed by: OTOLARYNGOLOGY

## 2017-04-21 PROCEDURE — 31574 LARGSC W/NJX AUGMENTATION: CPT | Mod: LT,S$GLB,, | Performed by: OTOLARYNGOLOGY

## 2017-04-21 RX ORDER — DULOXETIN HYDROCHLORIDE 20 MG/1
20 CAPSULE, DELAYED RELEASE ORAL DAILY
COMMUNITY
End: 2017-08-10 | Stop reason: SDUPTHER

## 2017-04-21 NOTE — PATIENT INSTRUCTIONS
VOCAL FOLD INJECTION AUGMENTATION     Description   If the vocal folds (vocal cords) cannot close completely, you may experience voice problems: roughness, breathiness, inability to get loud, increased vocal effort, increased vocal fatigue. Some patients may also experience aspiration (coughing or choking with swallowing). Vocal fold injection augmentation plumps up the vocal fold and/or repositions it in the midline in order to help the vocal folds close completely while speaking or swallowing. Following the procedure, most patients experience a louder, stronger, clearer voice. The procedure also helps some patients protect against aspiration, although the swallowing improvement is not as dramatic as the voice improvement. We use the following materials for the procedure: hyaluronic acid (Restylane); carboxymethylcellulose (Radiesse Voice Gel); and calcium hydroxyapatite (Radiesse Voice). For most patients, the injection is performed with a small needle passed through the skin of the neck. However, in some patients we perform the injection with a device passed through the mouth. In either case, the injection is guided by the visualization provided by a scope passed through the nose.     What to expect during the procedure   We perform the injection in our office under local (topical) anesthesia. You are awake the whole time, and the entire procedure lasts about 15 minutes. Our primary focus is your safety and comfort. We usually make the larynx numb by spraying the throat and/or dripping anesthetic on the larynx. At this time, you may cough or gag, or you may have the sensation that you spilled some water down the wrong pipe. These are temporary sensations that allow us to get you numb. The numbing process takes about 2 minutes. We will not proceed until we know you will be as comfortable as possible. A small needle is passed either through the skin of the neck or via a long instrument through the mouth to  perform the injection. During the injection, you may experience mild discomfort in the throat. You may feel an unusual sensation in the ear because the larynx and the ear share the same sensory nerve. In rare cases in which a patient does not tolerate the procedure, it may be performed in the operating room, with the patient completely asleep under general anesthesia.     What to expect afterwards   Most patients note a stronger, less effortful voice immediately after the injection. Sometimes the voice is tight or pressed voice is noted right after the injection. The voice usually has good days and bad days and gradually improves until you reach your new baseline voice over the first 1-2 weeks. Voice therapy may be a necessary part of your treatment plan to optimize your vocal outcome. None of the materials we inject are permanent. As the material dissolves, you may experience a gradual worsening of voice quality over the course of several months. At this point we may consider repeating the injection. You may be a candidate for a permanent fix, which involves an open surgery in the neck performed in the operating room.     Instructions: before the procedure   1. Do not take aspirin-containing products or other medications that can thin the blood (such as ibuprofen, Advil, Motrin, Aleve, Plavix) for 7 days prior to the injection. If you take Coumadin (warfarin), you may need to stop using this a few days prior to the injection. If you are on blood thinning (anti-platelet or anti-coagulant) medication and it is not clear what you should do, please clarify this with your physician.   2. On the day of your procedure, please make sure you take your other regular morning medications.   3. On the day of your procedure, it is OK to eat and drink as you would normally, up until 3 hours before to your appointment time. During that time frame, we ask that you restrict yourself only to clear liquids. A clear liquid is anything  you can see through (water, ginger ale, apple juice).     Instructions: after the procedure   1. Please refrain from eating or drinking for 1 hour following the procedure. This allows time for the numbing medication to wear off.   2. Most patients experience very little pain. If necessary, most patients are able to keep comfortable with plain Tylenol (acetaminophen) and/or other non-steroidal anti-inflammatory medications such as ibuprofen (Advil, Motrin). Please follow package instructions if considering taking these medications.   3. In most instances, it is OK to use your voice immediately after the procedure. However, for the first week, you should avoid speaking over heavy background noise or in a very loud voice. It is rare, but in some cases you will be asked to rest your voice for the first 24 hours.   4. Please call the Voice Center at [phone] if   · You have a temperature above 101°F   · You develop Increasing throat pain not relieved by over-the-counter medications   · You have any other post-operative questions or concerns   5. Please go immediately to the nearest emergency room if you are experiencing   · Shortness of breath   · Difficulty breathing   · Difficulty swallowing   · Severe bleeding     FREQUENTLY ASKED QUESTIONS     Is this a Botox injection? No. Botox weakens the voice box muscles. Instead, with a vocal fold injection augmentation, we are injecting filler material to bulk up the vocal fold(s).     How do you decide which material to use for the injection? Our decision is based on the indication for the procedure, the position of the vocal fold, and other patient historical/anatomical factors. In some instances, the approval of your insurance company is an important factor.     How to you decide which technique to use (through the neck versus through the mouth)? Patient anatomy and the position of the vocal fold play an important role. Other patient factors such as gag reflex are  also strongly considered.     Why do you perform this in your office instead of in the operating room? Performing the procedure in the office is safe and precise. In addition, performing the injection with the patient awake gives us direct visual and auditory feedback, which we do not get when the patient is asleep in the operating room. Furthermore, an office-based injection is much less time consuming, is more convenient for the patient, and does not involve the risks or the recovery time associated with general anesthesia. We can still do this in the operating room; we save that setting for specific diagnoses or situations, as well as for the rare patient who is unable to tolerate the awake procedure.     Why did I have discomfort in my ear (during or after the injection)? This is an example of referred pain. The ear and the larynx share the same sensory nerve.     I got an injection 3 days ago. Why is my voice still hoarse? To optimize vocal outcome, we overinject a little bit. Additionally, there may be a mild amount of swelling. Finally, the muscles of the larynx need to adjust to the injected material. Most people will have good days and bad days at first. After 1-2 weeks, you should settle out to your new baseline voice.     How long does the injection last? Carboxymethylcellulose (Radiesse Voice Gel) lasts approximately 1-2 months. Hyaluronic acid (Restylane) lasts approximately 4 months. Calcium hydroxyapatite (Radiesse Voice) lasts up to 1 year; however its characteristics are such that only few patients are appropriate for using this material.     Is there a permanent injectable material? No.     Can the injection be repeated? Yes. There is no limit to the number of times an injection can be repeated. However, a permanent surgical fix is often an option to consider.

## 2017-04-21 NOTE — PROCEDURES
Procedures   [facility]  Department of Otorhinolaryngology and Communication Sciences    Awake Laryngeal Procedure Operative Report    Preoperative Diagnosis: 1. Left vocal fold immobility.  2. Glottal insufficiency.  3. Dysphonia.    Postoperative Diagnosis: 1. Left vocal fold immobility.  2. Glottal insufficiency.  3. Dysphonia.    Procedure: Transnasal flexible magnified laryngoscopy with left vocal fold injection augmentation with hyaluronic acid (Restylane-L). [id number]    Surgeon: Baudilio Ba M.D.     Estimated blood loss: None.    Anesthesia: Local and topical.    Complications: None.    Findings: Appropriate medialization, convexity, and support with a total volume of 0.2 mL hyaluronic acid (Restylane-L).    Indications for procedure: Rosaura David is a 66 y.o. female with  left vocal fold immobility,  potentially recoverable, secondary to metastatic breast cancer. The patient presents for elective vocal fold injection augmentation. Risks of the procedure were discussed, including but not limited to bleeding, infection, allergic reaction to the injectable or medication, scarring, worsening of voice, early resorption, need for additional procedures, pain, epistaxis, laryngospasm, and airway obstruction which could necessitate need for intubation or tracheostomy. All questions were answered and the patient agreed to move forward with the procedure. Informed consent was obtained.    Procedure in detail: Rosaura David was positively identified with two identifiers and was brought into the procedure suite. She was seated upright in a comfortable position. Final time-out was performed for verification purposes. Local cutaneous and subcutaneous anesthesia was achieved with 1 mL of 2% lidocaine  injected into the skin and subcutaneous tissues at the level of the thyroid notch and into the pre-epiglottic space. Oropharyngeal anesthesia was not necessary. The nasal cavity was topically decongested with 1%  phenylephrine and topically anesthetized with 4% lidocaine. The distal chip digital videolaryngoscope was advanced through the patients most patent nasal cavity. The larynx was inspected under maximal magnification, with findings as noted above.    Attention was turned toward anesthetizing the endolarynx, which was achieved with a total volume of 3 mL of 4% lidocaine administered  in consecutive aliquots through the side port of the laryngoscope using the laryngeal gargle technique.    After an adequate degree of anesthesia was obtained, attention was turned to injection augmentation. A syringe pre-loaded with hyaluronic acid (Restylane-L) was loaded onto a 23 gauge 1.5 inch needle. Under the guidance of magnified laryngoscopy, the needle was advanced percutaneously, through the thyrohyoid membrane and infrapetiole epiglottis, into the endolarynx. The needle was advanced into the left vocal fold at the junction of the vocal process with the superior arcuate line. After confirmation of appropriate depth of the needle tip, careful injection augmentation of the left focal fold was carried out. Appropriate fullness, convexity, support, and medialization were achieved, with slight overcorrection, with a total volume of 0.2 mL injected. A pressed but less effortful voice, as well as a stronger cough, were noted immediately. The equipment was removed. The patient tolerated the procedure well without complication. All needle and instrument counts were correct at the completion of the case.    Attestation: As the attending of record, I was present and participated in all portions of this procedure.    Disposition: The patient was observed briefly before being discharged home in good condition. She was instructed to call the office or return to the emergency department should she develop a fever greater than 101 degrees F, increasing pain not relieved by over-the-counter medication, shortness of breath or noisy breathing,  difficulty swallowing, swelling, bleeding, or any other concerns. Post-procedure instructions were provided and were reviewed with the patient, who acknowledged understanding. She will follow up with me in about 4 weeks.    Baudilio Ba M.D.  [facility]  Department of Otorhinolaryngology and Communication Sciences

## 2017-04-21 NOTE — MR AVS SNAPSHOT
[facility]  [address]  [facility] [address]  Phone: [phone]  Fax: [phone]                  Rosaura David   2017 2:20 PM   Procedure visit    Description:  Female : 1950   Provider:  Baudilio Ba MD   Department:  [facility]           Diagnoses this Visit        Comments    Unilateral complete vocal fold paralysis    -  Primary     Voice disturbance                To Do List           Future Appointments        Provider Department Dept Phone    2017 2:30 PM [facility] [id number] [address] LIMIT [facility] [phone]    2017 2:30 PM Brittany Reynoso, [facility]-SL[facility] [phone]    2017 2:30 PM LABORATORY, [place] [facility] [phone]    2017 9:40 AM Yoni Allen NP [facility] - Hematology [phone]    2017 10:30 AM CHAIR 15, STPH OHS CHEMO [facility] [phone]      Goals (5 Years of Data)     None      Follow-Up and Disposition     Return in about 4 weeks (around 2017).      [facility]     [facility] - 24 Assistance  Unless otherwise directed by your provider, please contact [facility], our nurse care line that is available for  assistance.     Registered nurses in the [facility] provide: appointment scheduling, clinical advisement, health education, and other advisory services.  Call: [phone] (toll free)               Medications           Message regarding Medications     Verify the changes and/or additions to your medication regime listed below are the same as discussed with your clinician today.  If any of these changes or additions are incorrect, please notify your healthcare provider.             Verify that the below list of medications is an accurate representation of the medications you are currently taking.  If none reported, the list may be  blank. If incorrect, please contact your healthcare provider. Carry this list with you in case of emergency.           Current Medications     alprazolam (XANAX) 1 MG tablet Take 1 tablet (1 mg total) by mouth nightly as needed for Insomnia.    amlodipine-benazepril 5-20 mg (LOTREL) 5-20 mg per capsule TAKE 1 CAPSULE EVERY DAY    azelastine (ASTELIN) 137 mcg nasal spray 1 spray (137 mcg total) by Nasal route 2 (two) times daily as needed.    calcium-vitamin D 500-125 mg-unit tablet Take 1 tablet by mouth 2 (two) times daily.     duloxetine (CYMBALTA) 20 MG capsule Take 20 mg by mouth once daily.    fexofenadine (ALLEGRA) 30 MG tablet Take 30 mg by mouth as needed.     fluticasone (FLONASE) 50 mcg/actuation nasal spray     hydrochlorothiazide (HYDRODIURIL) 25 MG tablet Take 1 tablet (25 mg total) by mouth once daily.    lidocaine-prilocaine (EMLA) cream Apply to affected area once    meloxicam (MOBIC) 7.5 MG tablet as needed.     ondansetron (ZOFRAN-ODT) 8 MG TbDL Take 1 tablet (8 mg total) by mouth every 12 (twelve) hours as needed.    pantoprazole (PROTONIX) 40 MG tablet Take 40 mg by mouth once daily.     prochlorperazine (COMPAZINE) 10 MG tablet Take 1 tablet (10 mg total) by mouth every 6 (six) hours as needed.    thyroid, pork, (WP THYROID) 81.25 mg Tab Take 81.25 mg by mouth once daily.           Clinical Reference Information           Your Vitals Were     BP Pulse Temp Resp          151/106 (BP Location: Left arm, Patient Position: Sitting, BP Method: Automatic) 110 97.8 °F (36.6 °C) (Tympanic) 20        Blood Pressure          Most Recent Value    BP  (!)  151/106      Allergies as of 4/21/2017     Sulfa (Sulfonamide Antibiotics)    Adhesive      Immunizations Administered on Date of Encounter - 4/21/2017     None      Instructions      VOCAL FOLD INJECTION AUGMENTATION     Description   If the vocal folds (vocal cords) cannot close completely, you may experience voice problems: roughness, breathiness,  inability to get loud, increased vocal effort, increased vocal fatigue. Some patients may also experience aspiration (coughing or choking with swallowing). Vocal fold injection augmentation plumps up the vocal fold and/or repositions it in the midline in order to help the vocal folds close completely while speaking or swallowing. Following the procedure, most patients experience a louder, stronger, clearer voice. The procedure also helps some patients protect against aspiration, although the swallowing improvement is not as dramatic as the voice improvement. We use the following materials for the procedure: hyaluronic acid (Restylane); carboxymethylcellulose (Radiesse Voice Gel); and calcium hydroxyapatite (Radiesse Voice). For most patients, the injection is performed with a small needle passed through the skin of the neck. However, in some patients we perform the injection with a device passed through the mouth. In either case, the injection is guided by the visualization provided by a scope passed through the nose.     What to expect during the procedure   We perform the injection in our office under local (topical) anesthesia. You are awake the whole time, and the entire procedure lasts about 15 minutes. Our primary focus is your safety and comfort. We usually make the larynx numb by spraying the throat and/or dripping anesthetic on the larynx. At this time, you may cough or gag, or you may have the sensation that you spilled some water down the wrong pipe. These are temporary sensations that allow us to get you numb. The numbing process takes about 2 minutes. We will not proceed until we know you will be as comfortable as possible. A small needle is passed either through the skin of the neck or via a long instrument through the mouth to perform the injection. During the injection, you may experience mild discomfort in the throat. You may feel an unusual sensation in the ear because the larynx and the ear share  the same sensory nerve. In rare cases in which a patient does not tolerate the procedure, it may be performed in the operating room, with the patient completely asleep under general anesthesia.     What to expect afterwards   Most patients note a stronger, less effortful voice immediately after the injection. Sometimes the voice is tight or pressed voice is noted right after the injection. The voice usually has good days and bad days and gradually improves until you reach your new baseline voice over the first 1-2 weeks. Voice therapy may be a necessary part of your treatment plan to optimize your vocal outcome. None of the materials we inject are permanent. As the material dissolves, you may experience a gradual worsening of voice quality over the course of several months. At this point we may consider repeating the injection. You may be a candidate for a permanent fix, which involves an open surgery in the neck performed in the operating room.     Instructions: before the procedure   1. Do not take aspirin-containing products or other medications that can thin the blood (such as ibuprofen, Advil, Motrin, Aleve, Plavix) for 7 days prior to the injection. If you take Coumadin (warfarin), you may need to stop using this a few days prior to the injection. If you are on blood thinning (anti-platelet or anti-coagulant) medication and it is not clear what you should do, please clarify this with your physician.   2. On the day of your procedure, please make sure you take your other regular morning medications.   3. On the day of your procedure, it is OK to eat and drink as you would normally, up until 3 hours before to your appointment time. During that time frame, we ask that you restrict yourself only to clear liquids. A clear liquid is anything you can see through (water, ginger ale, apple juice).     Instructions: after the procedure   1. Please refrain from eating or drinking for 1 hour following the procedure. This  allows time for the numbing medication to wear off.   2. Most patients experience very little pain. If necessary, most patients are able to keep comfortable with plain Tylenol (acetaminophen) and/or other non-steroidal anti-inflammatory medications such as ibuprofen (Advil, Motrin). Please follow package instructions if considering taking these medications.   3. In most instances, it is OK to use your voice immediately after the procedure. However, for the first week, you should avoid speaking over heavy background noise or in a very loud voice. It is rare, but in some cases you will be asked to rest your voice for the first 24 hours.   4. Please call the [facility] Center at [phone] if   · You have a temperature above 101°F   · You develop Increasing throat pain not relieved by over-the-counter medications   · You have any other post-operative questions or concerns   5. Please go immediately to the nearest emergency room if you are experiencing   · Shortness of breath   · Difficulty breathing   · Difficulty swallowing   · Severe bleeding     FREQUENTLY ASKED QUESTIONS     Is this a Botox injection? No. Botox weakens the voice box muscles. Instead, with a vocal fold injection augmentation, we are injecting filler material to bulk up the vocal fold(s).     How do you decide which material to use for the injection? Our decision is based on the indication for the procedure, the position of the vocal fold, and other patient historical/anatomical factors. In some instances, the approval of your insurance company is an important factor.     How to you decide which technique to use (through the neck versus through the mouth)? Patient anatomy and the position of the vocal fold play an important role. Other patient factors such as gag reflex are also strongly considered.     Why do you perform this in your office instead of in the operating room? Performing the procedure in the office is safe and precise. In addition,  performing the injection with the patient awake gives us direct visual and auditory feedback, which we do not get when the patient is asleep in the operating room. Furthermore, an office-based injection is much less time consuming, is more convenient for the patient, and does not involve the risks or the recovery time associated with general anesthesia. We can still do this in the operating room; we save that setting for specific diagnoses or situations, as well as for the rare patient who is unable to tolerate the awake procedure.     Why did I have discomfort in my ear (during or after the injection)? This is an example of referred pain. The ear and the larynx share the same sensory nerve.     I got an injection 3 days ago. Why is my voice still hoarse? To optimize vocal outcome, we overinject a little bit. Additionally, there may be a mild amount of swelling. Finally, the muscles of the larynx need to adjust to the injected material. Most people will have good days and bad days at first. After 1-2 weeks, you should settle out to your new baseline voice.     How long does the injection last? Carboxymethylcellulose (Radiesse Voice Gel) lasts approximately 1-2 months. Hyaluronic acid (Restylane) lasts approximately 4 months. Calcium hydroxyapatite (Radiesse Voice) lasts up to 1 year; however its characteristics are such that only few patients are appropriate for using this material.     Is there a permanent injectable material? No.     Can the injection be repeated? Yes. There is no limit to the number of times an injection can be repeated. However, a permanent surgical fix is often an option to consider.            Language Assistance Services     ATTENTION: Language assistance services are available, free of charge. Please call [phone].      ATENCIÓN: Si melila cameron, tiene a rojo disposición servicios gratuitos de asistencia lingüística. Llame al [phone].     KATYA Ý: N?u b?n nói Ti?ng Vi?t, có các  d?ch v? h? tr? ngôn ng? mi?n phí anih cho b?n. G?i s? [phone].         [facility] complies with applicable Federal civil rights laws and does not discriminate on the basis of race, color, national origin, age, disability, or sex.

## 2017-04-28 ENCOUNTER — HOSPITAL ENCOUNTER (OUTPATIENT)
Dept: RADIOLOGY | Facility: HOSPITAL | Age: 67
Discharge: HOME | End: 2017-04-28
Attending: OTOLARYNGOLOGY
Payer: MEDICARE

## 2017-04-28 ENCOUNTER — CLINICAL SUPPORT (OUTPATIENT)
Dept: SPEECH THERAPY | Facility: HOSPITAL | Age: 67
End: 2017-04-28
Attending: OTOLARYNGOLOGY
Payer: MEDICARE

## 2017-04-28 DIAGNOSIS — D49.6 BRAINSTEM TUMOR: Primary | ICD-10-CM

## 2017-04-28 DIAGNOSIS — J38.01 UNILATERAL COMPLETE VOCAL FOLD PARALYSIS: ICD-10-CM

## 2017-04-28 DIAGNOSIS — R13.14 DYSPHAGIA, PHARYNGOESOPHAGEAL: ICD-10-CM

## 2017-04-28 DIAGNOSIS — R49.9 VOICE DISTURBANCE: ICD-10-CM

## 2017-04-28 PROCEDURE — 74230 X-RAY XM SWLNG FUNCJ C+: CPT | Mod: TC

## 2017-04-28 PROCEDURE — G8996 SWALLOW CURRENT STATUS: HCPCS | Mod: GN,CJ

## 2017-04-28 PROCEDURE — 92611 MOTION FLUOROSCOPY/SWALLOW: CPT | Mod: GN

## 2017-04-28 PROCEDURE — G8998 SWALLOW D/C STATUS: HCPCS | Mod: GN,CJ

## 2017-04-28 PROCEDURE — 74230 X-RAY XM SWLNG FUNCJ C+: CPT | Mod: 26,,, | Performed by: RADIOLOGY

## 2017-04-28 PROCEDURE — G8997 SWALLOW GOAL STATUS: HCPCS | Mod: GN,CH

## 2017-04-28 NOTE — MR AVS SNAPSHOT
[facility]  [address] [address]  Phone: [phone]                  Rosaura David   2017 2:30 PM   Clinical Support    Description:  Female : 1950   Provider:  Brittany Reynoso CCC-SLP   Department:  [facility]           Reason for Visit     SLP Initial Evaluation           Diagnoses this Visit        Comments    Brainstem tumor    -  Primary     Voice disturbance         Dysphagia, pharyngoesophageal         Unilateral complete vocal fold paralysis                To Do List           Future Appointments        Provider Department Dept Phone    2017 2:00 PM Baudilio Ba MD [facility] [phone]    2017 2:35 PM LABORATORY, [place] [facility] [phone]    2017 9:40 AM Leanna Bangura MD [facility] Hematology [phone]    2017 10:30 AM CHAIR 27, STPH OHS CHEMO [facility] [phone]    2017 10:30 AM CHAIR 33, [facility]PH OHS CHEMO [facility] [phone]      Goals (5 Years of Data)     None      [facility]     [facility] Care [facility] - 24/7 Assistance  Unless otherwise directed by your provider, please contact [facility], our nurse care line that is available for /7 assistance.     Registered nurses in the [facility] provide: appointment scheduling, clinical advisement, health education, and other advisory services.  Call: [phone] (toll free)               Medications           Message regarding Medications     Verify the changes and/or additions to your medication regime listed below are the same as discussed with your clinician today.  If any of these changes or additions are incorrect, please notify your healthcare provider.             Verify that the below list of medications is an accurate representation of the medications you are currently taking.  If none reported, the list  may be blank. If incorrect, please contact your healthcare provider. Carry this list with you in case of emergency.           Current Medications     alprazolam (XANAX) 1 MG tablet Take 1 tablet (1 mg total) by mouth nightly as needed for Insomnia.    amlodipine-benazepril 5-20 mg (LOTREL) 5-20 mg per capsule TAKE 1 CAPSULE EVERY DAY    azelastine (ASTELIN) 137 mcg nasal spray 1 spray (137 mcg total) by Nasal route 2 (two) times daily as needed.    calcium-vitamin D 500-125 mg-unit tablet Take 1 tablet by mouth 2 (two) times daily.     duloxetine (CYMBALTA) 20 MG capsule Take 20 mg by mouth once daily.    fexofenadine (ALLEGRA) 30 MG tablet Take 30 mg by mouth as needed.     fluticasone (FLONASE) 50 mcg/actuation nasal spray     hydrochlorothiazide (HYDRODIURIL) 25 MG tablet Take 1 tablet (25 mg total) by mouth once daily.    lidocaine-prilocaine (EMLA) cream Apply to affected area once    meloxicam (MOBIC) 7.5 MG tablet as needed.     ondansetron (ZOFRAN-ODT) 8 MG TbDL Take 1 tablet (8 mg total) by mouth every 12 (twelve) hours as needed.    pantoprazole (PROTONIX) 40 MG tablet Take 40 mg by mouth once daily.     prochlorperazine (COMPAZINE) 10 MG tablet Take 1 tablet (10 mg total) by mouth every 6 (six) hours as needed.    thyroid, pork, (WP THYROID) 81.25 mg Tab Take 81.25 mg by mouth once daily.           Clinical Reference Information           Allergies as of 4/28/2017     Sulfa (Sulfonamide Antibiotics)    Adhesive      Immunizations Administered on Date of Encounter - 4/28/2017     None      Orders Placed During Today's Visit      Normal Orders This Visit    SLP video swallow       Instructions      IMPRESSIONS:   This 66 y.o. lady appears to present with  1. Significant pharyngeal dysphagia characterized by   a. penetration and aspiration of small sips of thin liquids,   b. poor BOT movement resulting in significant residue throughout the pharynx and decreased UES opening to empty bolus into the  "esophagus.    2. As required for Medicare G-codes/severity indicators, the patient's swallowing was rated as follows:       RECOMMENDATIONS/PLAN OF CARE:   It is felt that patient would benefit from    1. Regular diet consistency with nectar thick liquids. Solids should be chopped finely and swallowed with extra nectar thick liquid in very small bites to facilitate moving them through the pharynx.     2. Compensatory strategies include: Upright posture for all oral intake and remain fully upright for at least 30 minutes to 1 hour after oral intake unless otherwise indicated by the physician or other healthcare provider. No head tilting upward or backward to eat/drink; if the head needs to be tilted upward/backward to get a sip from a bottle/glass/cup, then put only a small sip in the front of the mouth and lower the head to a level position before swallowing. Take small sips/bites (1/2 to 1 teaspoon in size or as otherwise tolerated or preferred by the patient). Do not inhale, talk, laugh, or make any sounds with the voice if swallowing something in the back of the mouth/throat. Swallow each single sip/bite fully all the way through the throat/neck before another is put in the mouth.   Take additional effortful "dry"/saliva swallows to help clear pooling in the throat.    Alternate food bites with small sips of liquid to clear food from the throat.    3. Take medications with nectar thick liquids as tolerated    4. Initiate dysphagia treatment for a short term to increase BOT movement in order to facilitate more efficient pharyngeal swallow.    5. Information will be mailed to the patient re: the above findings and recommendations.    6. Review of the swallow study results by the referring physician for further management of the patients concerns.    7. Contact [facility] at [phone] with any further questions or concerns.         Language Assistance Services     ATTENTION: Language assistance " services are available, free of charge. Please call [phone].      ATENCIÓN: Si habla cameron, tiene a rojo disposición servicios gratuitos de asistencia lingüística. Llame al [phone].     CHÚ Ý: N?u b?n nói Ti?ng Vi?t, có các d?ch v? h? tr? ngôn ng? mi?n phí dành cho b?n. G?i s? [phone].         [facility] complies with applicable Federal civil rights laws and does not discriminate on the basis of race, color, national origin, age, disability, or sex.

## 2017-04-28 NOTE — PROGRESS NOTES
MODIFIED BARIUM SWALLOW STUDY SPEECH PATHOLOGY REPORT    REASON FOR REFERRAL:  Rosaura David, age 66 y.o., was referred by Dr. Ba, Baudilio WARD MD  for a Modified Barium Swallow Study to rule out aspiration, assess his overall swallowing function, and determine safest consistencies for oral intake.  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      SWALLOWING HISTORY  Patient states she has been having difficulty swallowing since for three months  Diet consistency at present is regular with thin liquids.  Negative for weight loss. Negative for pneumonia  Medications are taken by mouth with water.  Patient has not been receiving dysphagia therapy through speech pathology services.        MEDICAL HISTORY:  Past Medical History:   Diagnosis Date    Arthritis     Brainstem tumor     Breast lesion     LEFT    Cancer     breast    HTN (hypertension)     Malignant neoplasm of nipple of right breast     Murmur     Osteopenia     Presence of dental bridge     LOWER    Seasonal allergies     Thyroid disease     Hypothyroidism    Wears glasses         SURGICAL HISTORY:  Past Surgical History:   Procedure Laterality Date    BREAST SURGERY Left     biopsy, and removal of papiloma    CHOLECYSTECTOMY  4/7/12    COLONOSCOPY  6/2012    2    CRANIOTOMY      CSF SHUNT      HYSTERECTOMY  1992    TVH BSO    mass taken off of back      MASTECTOMY      PORTACATH PLACEMENT Left          FAMILY HISTORY:  Family History   Problem Relation Age of Onset    Congenital heart disease Mother     Heart disease Mother     Colon cancer Father     Breast cancer Sister     Macular degeneration Brother     Cancer Maternal Grandmother      heart    Diabetes Paternal Grandfather     Amblyopia Neg Hx     Blindness Neg Hx     Cataracts Neg Hx     Glaucoma Neg Hx     Hypertension Neg Hx     Retinal detachment Neg Hx      Strabismus Neg Hx     Stroke Neg Hx     Thyroid disease Neg Hx         SOCIAL HISTORY:  Mrs. David lives with her  in [place], [place].     BEHAVIOR:  Rosaura David was a very pleasant lady who had normal affect and social interaction.  She was able to fully cooperate during the study.  Results of today's assessment were considered indicative of her current levels of swallowing functioning.      HEARING:  Subjectively, within normal limits.     ORAL PERIPHERAL:   Informal examination of the oral mechanism revealed structures and functioning within normal limits for swallowing and speech purposes.    Voice quality was weak and diplophonic. No wet or gurgled quality was appreciated before, during or after the study.    TEST FINDINGS:   Patient was seen in Radiology with the Radiologist for a Modified Barium Swallow Study and was seated on a chair for a left lateral videofluoroscopic view      Consistencies assessed using radiopaque barium contrast:  thin (3 ml and 5 ml boluses via spoon and single and continuous swallows via open cup), nectar  (3 ml and 5 ml boluses via spoon and single swallows via open cup), thin puree (3 ml and 5 ml) via spoon, thick puree (3 ml) via spoon, solid (3 ml and 5 ml cracker boluses) by patient's hand. Barium tablet/capsule could not be tested due to radiation time constraints.        Phases:  Oral:  Patient was able to obtain liquid and strip utensils adequately with no anterior loss of material from the oral cavity.  She moved boluses through the oral cavity with appropriate transit time.  There was no pooling of liquids in the mouth.  Swallow reflex was triggered within normal limits.    Pharyngeal:   Thin liquids  3 ml and 5 ml : No penetration or aspiration; residue in hypopharynx due to decreased BOT strength.  Small sips from open cup: aspiration; throat clear ineffective, chin tuck ineffective in increasing airway protection    Nectar thick liquids  3ml, 5 ml and small  sips from open cup: Flash penetration, no aspiration; significant pooling in hypopharynx and velleculae; Patient able to protect airway through several swallows.    Thin puree, thick puree; cracker:   As thickness of bolus increased, residue in vallecula and hypopharynx increased.Boluses moved through the pharyngeal phase with flash laryngeal penetration and no aspiration and no nasal regurgitation. Within normal limits with prompt initiation, appropriate soft palate elevation, adequate laryngeal elevation, adequate anterior hyoid excursion, adequate epiglottic movement, adequate laryngeal vestibular closure, significantly decreased pharyngeal stripping wave, resulting decreased PE segment opening, poor tongue base retraction, and significant pharyngeal residue.    Therapeutic interventions: Strategies were not successful in providing increased airway protection or decreasing residue after the swallow.  Thickening liquids to a nectar thickness proved beneficial in eliminating aspiration.     Cervical Esophageal:  Boluses entered the upper esophagus, but UES had poor opening resulting in significant pooling and residual left in the hypopharynx on almost all swallows.  Patient was able to attempt several consecutive swallows, each allowing a little more to pass through the sphincter, however, she rarely cleared it all.  She had awareness of the presence of the remaining bolus  No obstruction was noted.      Rosenbeck 8-point Penetration-Aspiration Scale:  Thin liquids, small sip from cup  7 - Material enters the airway, passes below the vocal folds, and is not ejected from the trachea despite effort.  Other consistencies:   2 - Material enters the airway, remains above the vocal folds, and is ejected from the airway.        IMPRESSIONS:   This 66 y.o. lady appears to present with  1. Significant pharyngeal dysphagia characterized by   a. penetration and aspiration of small sips of thin liquids,   b. poor BOT movement  resulting in significant residue throughout the pharynx and decreased UES opening to empty bolus into the esophagus.    2. As required for Medicare G-codes/severity indicators, the patient's swallowing was rated as follows:     Swallowing  Current status:  FCM:  LEVEL 5: Swallowing is safe with minimal diet restriction and/or occasionally requires minimal cueing to use compensatory strategies. The individual may occasionally self-cue. All nutrition and hydration needs are met by mouth at mealtime.   [facility]  Projected status:  FCM:   LEVEL 7: The individual's ability to eat independently is not limited by swallow function.  Swallowing would be safe and efficient for all consistencies. Compensatory strategies are effectively used when needed.   -   Discharge status:  FCM:   LEVEL 5: Swallowing is safe with minimal diet restriction and/or occasionally requires minimal cueing to use compensatory strategies. The individual may occasionally self-cue. All nutrition and hydration needs are met by mouth at mealtime.   -           RECOMMENDATIONS/PLAN OF CARE:   It is felt that patient would benefit from    1. Regular diet consistency with nectar thick liquids. Solids should be chopped finely and swallowed with extra nectar thick liquid in very small bites to facilitate moving them through the pharynx.     2. Compensatory strategies include: Upright posture for all oral intake and remain fully upright for at least 30 minutes to 1 hour after oral intake unless otherwise indicated by the physician or other healthcare provider. No head tilting upward or backward to eat/drink; if the head needs to be tilted upward/backward to get a sip from a bottle/glass/cup, then put only a small sip in the front of the mouth and lower the head to a level position before swallowing. Take small sips/bites (1/2 to 1 teaspoon in size or as otherwise tolerated or preferred by the patient). Do not inhale, talk, laugh, or make any  "sounds with the voice if swallowing something in the back of the mouth/throat. Swallow each single sip/bite fully all the way through the throat/neck before another is put in the mouth.   Take additional effortful "dry"/saliva swallows to help clear pooling in the throat.    Alternate food bites with small sips of liquid to clear food from the throat.    3. Take medications with nectar thick liquids as tolerated    4. Initiate dysphagia treatment for a short term to increase BOT movement in order to facilitate more efficient pharyngeal swallow.    5. Information will be mailed to the patient re: the above findings and recommendations.    6. Review of the swallow study results by the referring physician for further management of the patients concerns.    7. Contact [facility] at [phone] with any further questions or concerns.        Long-term goals:  Patient will be able to consume a regular diet with thin liquids with no signs/symptoms of aspiration.      Short-term objectives:  Patient will  1.  Report to dysphagia therapy to work on goals established at that time for increasing strength and efficiency of her pharyngeal swallow    "

## 2017-05-01 ENCOUNTER — LAB VISIT (OUTPATIENT)
Dept: LAB | Facility: HOSPITAL | Age: 67
End: 2017-05-01
Attending: INTERNAL MEDICINE
Payer: MEDICARE

## 2017-05-01 DIAGNOSIS — C50.011 MALIGNANT NEOPLASM INVOLVING BOTH NIPPLE AND AREOLA OF RIGHT BREAST IN FEMALE: ICD-10-CM

## 2017-05-01 LAB
ALBUMIN SERPL BCP-MCNC: 3.7 G/DL
ALP SERPL-CCNC: 85 U/L
ALT SERPL W/O P-5'-P-CCNC: 25 U/L
ANION GAP SERPL CALC-SCNC: 11 MMOL/L
AST SERPL-CCNC: 20 U/L
BASOPHILS # BLD AUTO: 0.05 K/UL
BASOPHILS NFR BLD: 1 %
BILIRUB SERPL-MCNC: 0.8 MG/DL
BUN SERPL-MCNC: 10 MG/DL
CALCIUM SERPL-MCNC: 9.9 MG/DL
CHLORIDE SERPL-SCNC: 107 MMOL/L
CO2 SERPL-SCNC: 26 MMOL/L
CREAT SERPL-MCNC: 0.8 MG/DL
DIFFERENTIAL METHOD: ABNORMAL
EOSINOPHIL # BLD AUTO: 0.1 K/UL
EOSINOPHIL NFR BLD: 2 %
ERYTHROCYTE [DISTWIDTH] IN BLOOD BY AUTOMATED COUNT: 15.3 %
EST. GFR  (AFRICAN AMERICAN): >60 ML/MIN/1.73 M^2
EST. GFR  (NON AFRICAN AMERICAN): >60 ML/MIN/1.73 M^2
GLUCOSE SERPL-MCNC: 117 MG/DL
HCT VFR BLD AUTO: 37.8 %
HGB BLD-MCNC: 12.9 G/DL
LYMPHOCYTES # BLD AUTO: 1 K/UL
LYMPHOCYTES NFR BLD: 20.4 %
MCH RBC QN AUTO: 28.7 PG
MCHC RBC AUTO-ENTMCNC: 34.1 %
MCV RBC AUTO: 84 FL
MONOCYTES # BLD AUTO: 0.6 K/UL
MONOCYTES NFR BLD: 12.6 %
NEUTROPHILS # BLD AUTO: 3.2 K/UL
NEUTROPHILS NFR BLD: 64 %
PLATELET # BLD AUTO: 263 K/UL
PMV BLD AUTO: 9.3 FL
POTASSIUM SERPL-SCNC: 3.9 MMOL/L
PROT SERPL-MCNC: 7 G/DL
RBC # BLD AUTO: 4.49 M/UL
SODIUM SERPL-SCNC: 144 MMOL/L
WBC # BLD AUTO: 5.01 K/UL

## 2017-05-01 PROCEDURE — 85025 COMPLETE CBC W/AUTO DIFF WBC: CPT | Mod: PO

## 2017-05-01 PROCEDURE — 80053 COMPREHEN METABOLIC PANEL: CPT

## 2017-05-01 PROCEDURE — 36415 COLL VENOUS BLD VENIPUNCTURE: CPT | Mod: PO

## 2017-05-02 ENCOUNTER — OFFICE VISIT (OUTPATIENT)
Dept: HEMATOLOGY/ONCOLOGY | Facility: CLINIC | Age: 67
End: 2017-05-02
Payer: MEDICARE

## 2017-05-02 ENCOUNTER — INFUSION (OUTPATIENT)
Dept: INFUSION THERAPY | Facility: HOSPITAL | Age: 67
End: 2017-05-02
Attending: INTERNAL MEDICINE
Payer: MEDICARE

## 2017-05-02 VITALS
DIASTOLIC BLOOD PRESSURE: 87 MMHG | BODY MASS INDEX: 33.57 KG/M2 | RESPIRATION RATE: 16 BRPM | TEMPERATURE: 98 F | WEIGHT: 201.5 LBS | SYSTOLIC BLOOD PRESSURE: 123 MMHG | HEIGHT: 65 IN | HEART RATE: 93 BPM

## 2017-05-02 VITALS — SYSTOLIC BLOOD PRESSURE: 128 MMHG | HEART RATE: 98 BPM | DIASTOLIC BLOOD PRESSURE: 81 MMHG

## 2017-05-02 DIAGNOSIS — C50.919 CARCINOMA OF BREAST METASTATIC TO BRAIN, UNSPECIFIED LATERALITY: Primary | ICD-10-CM

## 2017-05-02 DIAGNOSIS — C79.31 CARCINOMA OF BREAST METASTATIC TO BRAIN, UNSPECIFIED LATERALITY: Primary | ICD-10-CM

## 2017-05-02 DIAGNOSIS — C50.919 CARCINOMA OF BREAST METASTATIC TO BRAIN, UNSPECIFIED LATERALITY: ICD-10-CM

## 2017-05-02 DIAGNOSIS — C50.011 MALIGNANT NEOPLASM INVOLVING BOTH NIPPLE AND AREOLA OF RIGHT BREAST IN FEMALE: ICD-10-CM

## 2017-05-02 DIAGNOSIS — C79.31: Primary | ICD-10-CM

## 2017-05-02 DIAGNOSIS — C79.31 CARCINOMA OF BREAST METASTATIC TO BRAIN, UNSPECIFIED LATERALITY: ICD-10-CM

## 2017-05-02 DIAGNOSIS — C50.911: Primary | ICD-10-CM

## 2017-05-02 PROCEDURE — 96413 CHEMO IV INFUSION 1 HR: CPT | Mod: PN

## 2017-05-02 PROCEDURE — 96367 TX/PROPH/DG ADDL SEQ IV INF: CPT | Mod: PN

## 2017-05-02 PROCEDURE — 3079F DIAST BP 80-89 MM HG: CPT | Mod: S$GLB,,, | Performed by: NURSE PRACTITIONER

## 2017-05-02 PROCEDURE — 1126F AMNT PAIN NOTED NONE PRSNT: CPT | Mod: S$GLB,,, | Performed by: NURSE PRACTITIONER

## 2017-05-02 PROCEDURE — 63600175 PHARM REV CODE 636 W HCPCS: Mod: PN | Performed by: NURSE PRACTITIONER

## 2017-05-02 PROCEDURE — 3074F SYST BP LT 130 MM HG: CPT | Mod: S$GLB,,, | Performed by: NURSE PRACTITIONER

## 2017-05-02 PROCEDURE — 96377 APPLICATON ON-BODY INJECTOR: CPT | Mod: PN

## 2017-05-02 PROCEDURE — 99999 PR PBB SHADOW E&M-EST. PATIENT-LVL IV: CPT | Mod: 25,PBBFAC,, | Performed by: NURSE PRACTITIONER

## 2017-05-02 PROCEDURE — 1160F RVW MEDS BY RX/DR IN RCRD: CPT | Mod: S$GLB,,, | Performed by: NURSE PRACTITIONER

## 2017-05-02 PROCEDURE — 99214 OFFICE O/P EST MOD 30 MIN: CPT | Mod: S$GLB,,, | Performed by: NURSE PRACTITIONER

## 2017-05-02 PROCEDURE — 25000003 PHARM REV CODE 250: Mod: PN | Performed by: NURSE PRACTITIONER

## 2017-05-02 PROCEDURE — 96417 CHEMO IV INFUS EACH ADDL SEQ: CPT | Mod: PN

## 2017-05-02 PROCEDURE — 1159F MED LIST DOCD IN RCRD: CPT | Mod: S$GLB,,, | Performed by: NURSE PRACTITIONER

## 2017-05-02 RX ORDER — FAMOTIDINE 20 MG/50ML
20 INJECTION, SOLUTION INTRAVENOUS
Status: COMPLETED | OUTPATIENT
Start: 2017-05-02 | End: 2017-05-02

## 2017-05-02 RX ORDER — SODIUM CHLORIDE 0.9 % (FLUSH) 0.9 %
10 SYRINGE (ML) INJECTION
Status: CANCELLED | OUTPATIENT
Start: 2017-05-02

## 2017-05-02 RX ORDER — FAMOTIDINE 20 MG/50ML
20 INJECTION, SOLUTION INTRAVENOUS
Status: CANCELLED
Start: 2017-05-02 | End: 2017-05-02

## 2017-05-02 RX ORDER — SODIUM CHLORIDE 0.9 % (FLUSH) 0.9 %
10 SYRINGE (ML) INJECTION
Status: DISCONTINUED | OUTPATIENT
Start: 2017-05-02 | End: 2017-05-02 | Stop reason: HOSPADM

## 2017-05-02 RX ORDER — HEPARIN 100 UNIT/ML
500 SYRINGE INTRAVENOUS
Status: CANCELLED | OUTPATIENT
Start: 2017-05-02

## 2017-05-02 RX ORDER — HEPARIN 100 UNIT/ML
500 SYRINGE INTRAVENOUS
Status: DISCONTINUED | OUTPATIENT
Start: 2017-05-02 | End: 2017-05-02 | Stop reason: HOSPADM

## 2017-05-02 RX ADMIN — PEGFILGRASTIM 6 MG: KIT SUBCUTANEOUS at 03:05

## 2017-05-02 RX ADMIN — FAMOTIDINE 20 MG: 20 INJECTION, SOLUTION INTRAVENOUS at 10:05

## 2017-05-02 RX ADMIN — SODIUM CHLORIDE: 0.9 INJECTION, SOLUTION INTRAVENOUS at 10:05

## 2017-05-02 RX ADMIN — SODIUM CHLORIDE 16 MG: 9 INJECTION, SOLUTION INTRAVENOUS at 11:05

## 2017-05-02 RX ADMIN — HEPARIN SODIUM (PORCINE) LOCK FLUSH IV SOLN 100 UNIT/ML 500 UNITS: 100 SOLUTION at 03:05

## 2017-05-02 RX ADMIN — Medication 10 ML: at 03:05

## 2017-05-02 RX ADMIN — SODIUM CHLORIDE 20 MG: 9 INJECTION, SOLUTION INTRAVENOUS at 10:05

## 2017-05-02 RX ADMIN — PACLITAXEL 360 MG: 6 INJECTION, SOLUTION INTRAVENOUS at 12:05

## 2017-05-02 RX ADMIN — DIPHENHYDRAMINE HYDROCHLORIDE 50 MG: 50 INJECTION, SOLUTION INTRAMUSCULAR; INTRAVENOUS at 11:05

## 2017-05-02 NOTE — PLAN OF CARE
Problem: Fall Risk (Adult)  Goal: Absence of Falls  Patient will demonstrate the desired outcomes by discharge/transition of care.   Outcome: Ongoing (interventions implemented as appropriate)  TOLERATED TREATMENT WITHOUT DIFFICULTY.

## 2017-05-02 NOTE — PROGRESS NOTES
Subjective:       Patient ID: Rosaura David is a 66 y.o. female.    Chief Complaint: Met breast ca with CNS metastases.  Presents with  for evaluation prior to Cycle 3 Taxol  HPI:   This is a a 66-year-old  woman known to Dr. Bangura.  The patient  was diagnosed with high-grade infiltrating right breast ductal carcinoma with hypermetabolic   lymph nodes along supraclavicular and subdural internal mammary and right   axillary lymph nodes, SUV of 17-18 noted. She was triple negative at that   time. The patient received Adriamycin and Cytoxan chemotherapy x4 followed by   Taxotere x4. She had some side effects of neuropathy and at the completion, she  had marked improvement of the hypermetabolic breast mass that was reduced to   almost scar like area and all the lymph nodes that were lighting up in multiple   areas had disappeared. On 09/20/2015, the patient underwent four sentinel   lymph node evaluations that were negative for metastases and right breast   partial mastectomy, which showed benign breast tissue with small focus of fat   necrosis from previous biopsy site. No residual carcinoma was noted. The   patient was sent for Radiation Oncology evaluation and opted not to have any   adjuvant radiation therapy and was undergoing surveillance. She had a followup   PET scan for surveillance done in October 2015, which showed recurrence with   interval development of extensive hypermetabolic lymphadenopathy within the   right axillary region, right subpectoral region consistent with metastatic   disease. No other metastatic areas were found and after much debate, the patient   reembarked on the same Adriamycin and Cytoxan x4.  At the completion of therapy, the patient   showed complete clearing of all the disease noted in the lymph nodes and in the breast.  She had laser mastectomy under Dr. Atwood in [place], no residual disease in breast or lymph nodes on path report.   Pt also had the lipoma under the rt  arm removed.  She then underwent adjuvant xrt with Dr. Santiago.  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 Wong at [facility]. New mass has also shown triple negativity.  Patient is undergoing treatment and presents with her  for evaluation prior to Cycle 3 of Taxol.  She denies any difficulties with fevers, chills, drenching night sweats, new breast complaints, seizures,  abdominal discomfort/bloating, nausea/vomiting, constipation, diarrhea, mouth sores, bleeding, etc.  No other new complaints or pertinent findings on a 14 point review of systems.    PHYSICAL ASSESSMENT:    GENERAL: Well-developed, well-nourished white gentleman in no acute distress.  VITAL SIGNS: Weight 201.5 pounds (decrease of 2 pounds in 3 weeks).  Wt Readings from Last 3 Encounters:   05/02/17 91.4 kg (201 lb 8 oz)   04/11/17 92.4 kg (203 lb 11.3 oz)   04/07/17 93.6 kg (206 lb 5.6 oz)     Temp Readings from Last 3 Encounters:   05/02/17 98.2 °F (36.8 °C)   04/21/17 97.8 °F (36.6 °C) (Tympanic)   04/11/17 98.4 °F (36.9 °C)     BP Readings from Last 3 Encounters:   05/02/17 123/87   04/21/17 (!) 151/106   04/11/17 139/84     Pulse Readings from Last 3 Encounters:   05/02/17 93   04/21/17 110   04/11/17 84     HEENT: Normocephalic, atraumatic. Oral mucosa pink and moist. Lips without   lesions. Tongue midline. Oropharynx clear. Nonicteric sclerae. Left eye ptosis.  NECK: Supple, no adenopathy. No carotid bruits, thyromegaly or thyroid nodule.  HEART: Regular rate and rhythm without murmur, gallop or rub.   LUNGS: Clear to auscultation bilaterally. Normal respiratory effort.   ABDOMEN: Soft, nontender,  nondistended with positive normoactive bowel sounds,   no hepatosplenomegaly.   EXTREMITIES: No cyanosis, clubbing or edema. Distal pulses are intact.   AXILLAE AND GROIN: No palpable pathologic lymphadenopathy is appreciated.   SKIN: Intact/turgor normal   NEUROLOGIC: Uses a walker for ambulation.  Steady gait.    Laboratory:     CBC:  Lab Results   Component Value Date    WBC 5.01 05/01/2017    HGB 12.9 05/01/2017    HCT 37.8 05/01/2017    MCV 84 05/01/2017     05/01/2017     64%grans    CMP  Sodium   Date Value Ref Range Status   05/01/2017 144 136 - 145 mmol/L Final     Potassium   Date Value Ref Range Status   05/01/2017 3.9 3.5 - 5.1 mmol/L Final     Chloride   Date Value Ref Range Status   05/01/2017 107 95 - 110 mmol/L Final     CO2   Date Value Ref Range Status   05/01/2017 26 23 - 29 mmol/L Final     Glucose   Date Value Ref Range Status   05/01/2017 117 (H) 70 - 110 mg/dL Final     BUN, Bld   Date Value Ref Range Status   05/01/2017 10 8 - 23 mg/dL Final     Creatinine   Date Value Ref Range Status   05/01/2017 0.8 0.5 - 1.4 mg/dL Final     Calcium   Date Value Ref Range Status   05/01/2017 9.9 8.7 - 10.5 mg/dL Final     Total Protein   Date Value Ref Range Status   05/01/2017 7.0 6.0 - 8.4 g/dL Final     Albumin   Date Value Ref Range Status   05/01/2017 3.7 3.5 - 5.2 g/dL Final     Total Bilirubin   Date Value Ref Range Status   05/01/2017 0.8 0.1 - 1.0 mg/dL Final     Comment:     For infants and newborns, interpretation of results should be based  on gestational age, weight and in agreement with clinical  observations.  Premature Infant recommended reference ranges:  Up to 24 hours.............<8.0 mg/dL  Up to 48 hours............<12.0 mg/dL  3-5 days..................<15.0 mg/dL  6-29 days.................<15.0 mg/dL       Alkaline Phosphatase   Date Value Ref Range Status   05/01/2017 85 55 - 135 U/L Final     AST   Date Value Ref Range Status   05/01/2017 20 10 - 40 U/L Final     ALT   Date  Value Ref Range Status   05/01/2017 25 10 - 44 U/L Final     Anion Gap   Date Value Ref Range Status   05/01/2017 11 8 - 16 mmol/L Final     eGFR if    Date Value Ref Range Status   05/01/2017 >60.0 >60 mL/min/1.73 m^2 Final     eGFR if non    Date Value Ref Range Status   05/01/2017 >60.0 >60 mL/min/1.73 m^2 Final     Comment:     Calculation used to obtain the estimated glomerular filtration  rate (eGFR) is the CKD-EPI equation. Since race is unknown   in our information system, the eGFR values for   -American and Non--American patients are given   for each creatinine result.       IMPRESSION:  Metastatic malignant right breast cancer with brain metastases.    Assessment/Plan:       1.  Proceed with Cycle 3 of Taxol dosed at 175 mg/m2 (360 mg) IV with premedications.  2.  Neulasta 6 mg OBI today.  3.  Follow up in clinic in 3 weeks with interval CBC, CMP for evaluation prior to Cycle 4 of Taxol.    Assessment/plan reviewed and approved by Dr. Cardona.

## 2017-05-05 ENCOUNTER — PATIENT MESSAGE (OUTPATIENT)
Dept: HEMATOLOGY/ONCOLOGY | Facility: CLINIC | Age: 67
End: 2017-05-05

## 2017-05-08 NOTE — PATIENT INSTRUCTIONS
"  IMPRESSIONS:   This 66 y.o. lady appears to present with  1. Significant pharyngeal dysphagia characterized by   a. penetration and aspiration of small sips of thin liquids,   b. poor BOT movement resulting in significant residue throughout the pharynx and decreased UES opening to empty bolus into the esophagus.    2. As required for Medicare G-codes/severity indicators, the patient's swallowing was rated as follows:       RECOMMENDATIONS/PLAN OF CARE:   It is felt that patient would benefit from    1. Regular diet consistency with nectar thick liquids. Solids should be chopped finely and swallowed with extra nectar thick liquid in very small bites to facilitate moving them through the pharynx.     2. Compensatory strategies include: Upright posture for all oral intake and remain fully upright for at least 30 minutes to 1 hour after oral intake unless otherwise indicated by the physician or other healthcare provider. No head tilting upward or backward to eat/drink; if the head needs to be tilted upward/backward to get a sip from a bottle/glass/cup, then put only a small sip in the front of the mouth and lower the head to a level position before swallowing. Take small sips/bites (1/2 to 1 teaspoon in size or as otherwise tolerated or preferred by the patient). Do not inhale, talk, laugh, or make any sounds with the voice if swallowing something in the back of the mouth/throat. Swallow each single sip/bite fully all the way through the throat/neck before another is put in the mouth.   Take additional effortful "dry"/saliva swallows to help clear pooling in the throat.    Alternate food bites with small sips of liquid to clear food from the throat.    3. Take medications with nectar thick liquids as tolerated    4. Initiate dysphagia treatment for a short term to increase BOT movement in order to facilitate more efficient pharyngeal swallow.    5. Information will be mailed to the patient re: the above findings and " recommendations.    6. Review of the swallow study results by the referring physician for further management of the patients concerns.    7. Contact [facility] at [phone] with any further questions or concerns.

## 2017-05-19 ENCOUNTER — OFFICE VISIT (OUTPATIENT)
Dept: OTOLARYNGOLOGY | Facility: CLINIC | Age: 67
End: 2017-05-19
Payer: MEDICARE

## 2017-05-19 VITALS
TEMPERATURE: 96 F | WEIGHT: 201.94 LBS | HEART RATE: 103 BPM | BODY MASS INDEX: 33.6 KG/M2 | DIASTOLIC BLOOD PRESSURE: 83 MMHG | SYSTOLIC BLOOD PRESSURE: 132 MMHG

## 2017-05-19 DIAGNOSIS — R49.9 VOICE DISTURBANCE: Primary | ICD-10-CM

## 2017-05-19 DIAGNOSIS — J38.01 UNILATERAL COMPLETE VOCAL FOLD PARALYSIS: ICD-10-CM

## 2017-05-19 PROCEDURE — 99499 UNLISTED E&M SERVICE: CPT | Mod: S$GLB,,, | Performed by: OTOLARYNGOLOGY

## 2017-05-19 PROCEDURE — 99999 PR PBB SHADOW E&M-EST. PATIENT-LVL III: CPT | Mod: PBBFAC,,, | Performed by: OTOLARYNGOLOGY

## 2017-05-19 PROCEDURE — 31579 LARYNGOSCOPY TELESCOPIC: CPT | Mod: S$GLB,,, | Performed by: OTOLARYNGOLOGY

## 2017-05-19 NOTE — MR AVS SNAPSHOT
[facility]  [address]  [facility] [place] [facility] [address]  Phone: [phone]  Fax: [phone]                  Rosaura David   2017 2:00 PM   Office Visit    Description:  Female : 1950   Provider:  Baudilio Ba MD   Department:  [facility]           Reason for Visit     Follow-up           Diagnoses this Visit        Comments    Voice disturbance    -  Primary     Unilateral complete vocal fold paralysis                To Do List           Future Appointments        Provider Department Dept Phone    2017 2:35 PM LABORATORY, [place] [facility] [phone]    2017 9:40 AM Leanna Bangura MD [facility] - Hematology [phone]    2017 10:30 AM CHAIR 19, [facility] [phone]    2017 10:30 AM CHAIR 33, ST [facility] CHEMO [facility] [phone]    6/15/2017 9:00 AM [facility] MRI1 [facility] [phone]      Goals (5 Years of Data)     None      Follow-Up and Disposition     Return in about 3 months (around 2017).      [facility]     [facility] Nurse Care Line - 24/ Assistance  Unless otherwise directed by your provider, please contact [facility], our nurse care line that is available for  assistance.     Registered nurses in the [facility] provide: appointment scheduling, clinical advisement, health education, and other advisory services.  Call: [phone] (toll free)               Medications           Message regarding Medications     Verify the changes and/or additions to your medication regime listed below are the same as discussed with your clinician today.  If any of these changes or additions are incorrect, please notify your healthcare provider.             Verify that the below list of medications is an accurate representation of the medications you are currently taking.  If none  reported, the list may be blank. If incorrect, please contact your healthcare provider. Carry this list with you in case of emergency.           Current Medications     alprazolam (XANAX) 1 MG tablet Take 1 tablet (1 mg total) by mouth nightly as needed for Insomnia.    amlodipine-benazepril 5-20 mg (LOTREL) 5-20 mg per capsule TAKE 1 CAPSULE EVERY DAY    azelastine (ASTELIN) 137 mcg nasal spray 1 spray (137 mcg total) by Nasal route 2 (two) times daily as needed.    calcium-vitamin D 500-125 mg-unit tablet Take 1 tablet by mouth 2 (two) times daily.     duloxetine (CYMBALTA) 20 MG capsule Take 20 mg by mouth once daily.    fexofenadine (ALLEGRA) 30 MG tablet Take 30 mg by mouth as needed.     fluticasone (FLONASE) 50 mcg/actuation nasal spray     hydrochlorothiazide (HYDRODIURIL) 25 MG tablet Take 1 tablet (25 mg total) by mouth once daily.    lidocaine-prilocaine (EMLA) cream Apply to affected area once    meloxicam (MOBIC) 7.5 MG tablet as needed.     ondansetron (ZOFRAN-ODT) 8 MG TbDL Take 1 tablet (8 mg total) by mouth every 12 (twelve) hours as needed.    pantoprazole (PROTONIX) 40 MG tablet Take 40 mg by mouth once daily.     prochlorperazine (COMPAZINE) 10 MG tablet Take 1 tablet (10 mg total) by mouth every 6 (six) hours as needed.    thyroid, pork, (WP THYROID) 81.25 mg Tab Take 81.25 mg by mouth once daily.           Clinical Reference Information           Your Vitals Were     BP Pulse Temp Weight BMI    132/83 103 95.8 °F (35.4 °C) 91.6 kg (201 lb 15.1 oz) 33.6 kg/m2      Blood Pressure          Most Recent Value    BP  132/83      Allergies as of 5/19/2017     Sulfa (Sulfonamide Antibiotics)    Adhesive      Immunizations Administered on Date of Encounter - 5/19/2017     None      Instructions    Adhere to  The SLP diet recommendations       Language Assistance Services     ATTENTION: Language assistance services are available, free of charge. Please call [phone].      ATENCIÓN: Mitchel kaur  español, tiene a rojo disposición servicios gratuitos de asistencia lingüística. Dolores al [phone].     KATYA Ý: N?u b?n nói Ti?ng Vi?t, có các d?ch v? h? tr? ngôn ng? mi?n phí dành cho b?n. G?i s? [phone].         [facility] complies with applicable Federal civil rights laws and does not discriminate on the basis of race, color, national origin, age, disability, or sex.

## 2017-05-22 ENCOUNTER — LAB VISIT (OUTPATIENT)
Dept: LAB | Facility: HOSPITAL | Age: 67
End: 2017-05-22
Attending: INTERNAL MEDICINE
Payer: MEDICARE

## 2017-05-22 DIAGNOSIS — C79.31: ICD-10-CM

## 2017-05-22 DIAGNOSIS — C50.911: ICD-10-CM

## 2017-05-22 LAB
ALBUMIN SERPL BCP-MCNC: 3.5 G/DL
ALP SERPL-CCNC: 82 U/L
ALT SERPL W/O P-5'-P-CCNC: 19 U/L
ANION GAP SERPL CALC-SCNC: 7 MMOL/L
AST SERPL-CCNC: 14 U/L
BASOPHILS # BLD AUTO: 0.03 K/UL
BASOPHILS NFR BLD: 0.6 %
BILIRUB SERPL-MCNC: 0.6 MG/DL
BUN SERPL-MCNC: 12 MG/DL
CALCIUM SERPL-MCNC: 9.8 MG/DL
CHLORIDE SERPL-SCNC: 106 MMOL/L
CO2 SERPL-SCNC: 29 MMOL/L
CREAT SERPL-MCNC: 0.8 MG/DL
DIFFERENTIAL METHOD: ABNORMAL
EOSINOPHIL # BLD AUTO: 0.1 K/UL
EOSINOPHIL NFR BLD: 2.9 %
ERYTHROCYTE [DISTWIDTH] IN BLOOD BY AUTOMATED COUNT: 15.4 %
EST. GFR  (AFRICAN AMERICAN): >60 ML/MIN/1.73 M^2
EST. GFR  (NON AFRICAN AMERICAN): >60 ML/MIN/1.73 M^2
GLUCOSE SERPL-MCNC: 101 MG/DL
HCT VFR BLD AUTO: 36.4 %
HGB BLD-MCNC: 12.3 G/DL
LYMPHOCYTES # BLD AUTO: 0.9 K/UL
LYMPHOCYTES NFR BLD: 18.7 %
MCH RBC QN AUTO: 29.1 PG
MCHC RBC AUTO-ENTMCNC: 33.8 %
MCV RBC AUTO: 86 FL
MONOCYTES # BLD AUTO: 0.7 K/UL
MONOCYTES NFR BLD: 15.4 %
NEUTROPHILS # BLD AUTO: 3 K/UL
NEUTROPHILS NFR BLD: 62.4 %
PLATELET # BLD AUTO: 226 K/UL
PMV BLD AUTO: 10 FL
POTASSIUM SERPL-SCNC: 4.1 MMOL/L
PROT SERPL-MCNC: 6.4 G/DL
RBC # BLD AUTO: 4.22 M/UL
SODIUM SERPL-SCNC: 142 MMOL/L
WBC # BLD AUTO: 4.82 K/UL

## 2017-05-22 PROCEDURE — 36415 COLL VENOUS BLD VENIPUNCTURE: CPT | Mod: PO

## 2017-05-22 PROCEDURE — 80053 COMPREHEN METABOLIC PANEL: CPT

## 2017-05-22 PROCEDURE — 85025 COMPLETE CBC W/AUTO DIFF WBC: CPT | Mod: PO

## 2017-05-22 NOTE — PROGRESS NOTES
[facility]  Department of Otorhinolaryngology and Communication Sciences    Subjective:      Rosaura David is a 66 y.o. female who presents for follow-up. 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    She underwent injection augmentation. She has noted improvements in her voice, but still does experience intermittent fluctuating hoarseness. She underwent MBSS recently. She aspirated thin liquids but did well with nectar thick liquids.    TREATMENT HISTORY:  4/21/2017: LEFT VF injection augmentation - Resylane-L    The patient's medications, allergies, past medical, surgical, social and family histories were reviewed and updated as appropriate.    A detailed review of systems was obtained with pertinent positives as per the above HPI, and otherwise negative.      Objective:     /83   Pulse 103   Temp (!) 95.8 °F (35.4 °C)   Wt 91.6 kg (201 lb 15.1 oz)   BMI 33.60 kg/m²      Constitutional: comfortable, well dressed  Psychiatric: appropriate affect  Respiratory: comfortably breathing, symmetric chest rise, no stridor  Voice: interval improvement in strength and projection, persistent low breath support, inconsistent/variable roughness  Head: normocephalic  Eyes: conjunctivae and sclerae clear  Indirect laryngoscopy is limited due to gag    Procedure  Rigid Laryngeal Videostroboscopy (55571): Laryngeal videostroboscopy is indicated to assess the laryngeal vibratory biomechanics and vocal fold oscillation, which cannot be assessed with a plain light examination. This was carried out with a 70 degree endoscope. After verbal consent was obtained, the patient was positioned and the tongue was gently secured with a gauze sponge. The endoscope was passed transorally and positioned to image the larynx and hypopharynx in detail. The following featured were examined: laryngeal and hypopharyngeal masses; vocal fold range and symmetry of motion; laryngeal  mucosal edema, erythema, inflammation, and hydration; salivary pooling; and gross laryngeal sensation. During phonation, the vocal folds were assesses for glottal closure; mucosal wave; vocal fold lesions; vibratory periodicity, amplitude, and phase symmetry; and vertical height match. The equipment was removed. The patient tolerated the procedure well without complication. All findings were normal except:  - left vocal fold immobile, midline, interval improvement in contour/convexity  - complete glottal closure  - compensatory supraglottic laryngeal hyperfunction      Assessment:     Rosaura David is a 66 y.o. 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    She is dong well following injection augmentation.     Plan:     I recommended adherence to the SLP diet guidelines. She will follow up with me in 3 months, or sooner if needed.    All questions were answered, and the patient is in agreement with the plan.    Baudilio Ba M.D.  [facility]  Department of Otorhinolaryngology and Communication Sciences

## 2017-05-23 ENCOUNTER — OFFICE VISIT (OUTPATIENT)
Dept: HEMATOLOGY/ONCOLOGY | Facility: CLINIC | Age: 67
End: 2017-05-23
Payer: MEDICARE

## 2017-05-23 ENCOUNTER — DOCUMENTATION ONLY (OUTPATIENT)
Dept: INFUSION THERAPY | Facility: HOSPITAL | Age: 67
End: 2017-05-23

## 2017-05-23 ENCOUNTER — INFUSION (OUTPATIENT)
Dept: INFUSION THERAPY | Facility: HOSPITAL | Age: 67
End: 2017-05-23
Attending: INTERNAL MEDICINE
Payer: MEDICARE

## 2017-05-23 ENCOUNTER — PATIENT MESSAGE (OUTPATIENT)
Dept: HEMATOLOGY/ONCOLOGY | Facility: CLINIC | Age: 67
End: 2017-05-23

## 2017-05-23 VITALS — DIASTOLIC BLOOD PRESSURE: 67 MMHG | SYSTOLIC BLOOD PRESSURE: 121 MMHG | HEART RATE: 88 BPM

## 2017-05-23 VITALS
RESPIRATION RATE: 16 BRPM | TEMPERATURE: 99 F | BODY MASS INDEX: 33.94 KG/M2 | HEIGHT: 65 IN | WEIGHT: 203.69 LBS | SYSTOLIC BLOOD PRESSURE: 125 MMHG | HEART RATE: 88 BPM | DIASTOLIC BLOOD PRESSURE: 82 MMHG

## 2017-05-23 DIAGNOSIS — I10 ESSENTIAL HYPERTENSION: ICD-10-CM

## 2017-05-23 DIAGNOSIS — H02.433: ICD-10-CM

## 2017-05-23 DIAGNOSIS — C79.31 BRAIN METASTASES: Primary | ICD-10-CM

## 2017-05-23 DIAGNOSIS — R49.9 VOICE DISTURBANCE: ICD-10-CM

## 2017-05-23 DIAGNOSIS — C79.31 CARCINOMA OF BREAST METASTATIC TO BRAIN, UNSPECIFIED LATERALITY: Primary | ICD-10-CM

## 2017-05-23 DIAGNOSIS — Z98.2 S/P VP SHUNT: ICD-10-CM

## 2017-05-23 DIAGNOSIS — Z98.890 S/P CRANIOTOMY: ICD-10-CM

## 2017-05-23 DIAGNOSIS — C50.919 CARCINOMA OF BREAST METASTATIC TO BRAIN, UNSPECIFIED LATERALITY: Primary | ICD-10-CM

## 2017-05-23 DIAGNOSIS — C50.011 MALIGNANT NEOPLASM INVOLVING BOTH NIPPLE AND AREOLA OF RIGHT BREAST IN FEMALE: ICD-10-CM

## 2017-05-23 PROCEDURE — 99215 OFFICE O/P EST HI 40 MIN: CPT | Mod: S$GLB,,, | Performed by: INTERNAL MEDICINE

## 2017-05-23 PROCEDURE — 1157F ADVNC CARE PLAN IN RCRD: CPT | Mod: S$GLB,,, | Performed by: INTERNAL MEDICINE

## 2017-05-23 PROCEDURE — 99999 PR PBB SHADOW E&M-EST. PATIENT-LVL III: CPT | Mod: PBBFAC,,, | Performed by: INTERNAL MEDICINE

## 2017-05-23 PROCEDURE — 1126F AMNT PAIN NOTED NONE PRSNT: CPT | Mod: S$GLB,,, | Performed by: INTERNAL MEDICINE

## 2017-05-23 PROCEDURE — 96413 CHEMO IV INFUSION 1 HR: CPT | Mod: PN

## 2017-05-23 PROCEDURE — 25000003 PHARM REV CODE 250: Mod: PN | Performed by: INTERNAL MEDICINE

## 2017-05-23 PROCEDURE — 1159F MED LIST DOCD IN RCRD: CPT | Mod: S$GLB,,, | Performed by: INTERNAL MEDICINE

## 2017-05-23 PROCEDURE — 96415 CHEMO IV INFUSION ADDL HR: CPT | Mod: PN

## 2017-05-23 PROCEDURE — 96367 TX/PROPH/DG ADDL SEQ IV INF: CPT | Mod: PN

## 2017-05-23 PROCEDURE — 3079F DIAST BP 80-89 MM HG: CPT | Mod: S$GLB,,, | Performed by: INTERNAL MEDICINE

## 2017-05-23 PROCEDURE — 63600175 PHARM REV CODE 636 W HCPCS: Mod: PN | Performed by: INTERNAL MEDICINE

## 2017-05-23 PROCEDURE — 1160F RVW MEDS BY RX/DR IN RCRD: CPT | Mod: S$GLB,,, | Performed by: INTERNAL MEDICINE

## 2017-05-23 PROCEDURE — 3074F SYST BP LT 130 MM HG: CPT | Mod: S$GLB,,, | Performed by: INTERNAL MEDICINE

## 2017-05-23 RX ORDER — HEPARIN 100 UNIT/ML
500 SYRINGE INTRAVENOUS
Status: DISCONTINUED | OUTPATIENT
Start: 2017-05-23 | End: 2017-05-23 | Stop reason: HOSPADM

## 2017-05-23 RX ORDER — SODIUM CHLORIDE 0.9 % (FLUSH) 0.9 %
10 SYRINGE (ML) INJECTION
Status: DISCONTINUED | OUTPATIENT
Start: 2017-05-23 | End: 2017-05-23 | Stop reason: HOSPADM

## 2017-05-23 RX ORDER — HEPARIN 100 UNIT/ML
500 SYRINGE INTRAVENOUS
Status: CANCELLED | OUTPATIENT
Start: 2017-05-23

## 2017-05-23 RX ORDER — FAMOTIDINE 20 MG/50ML
20 INJECTION, SOLUTION INTRAVENOUS
Status: CANCELLED
Start: 2017-05-23 | End: 2017-05-23

## 2017-05-23 RX ORDER — FAMOTIDINE 20 MG/50ML
20 INJECTION, SOLUTION INTRAVENOUS
Status: COMPLETED | OUTPATIENT
Start: 2017-05-23 | End: 2017-05-23

## 2017-05-23 RX ORDER — SODIUM CHLORIDE 0.9 % (FLUSH) 0.9 %
10 SYRINGE (ML) INJECTION
Status: CANCELLED | OUTPATIENT
Start: 2017-05-23

## 2017-05-23 RX ADMIN — Medication 10 ML: at 03:05

## 2017-05-23 RX ADMIN — HEPARIN SODIUM (PORCINE) LOCK FLUSH IV SOLN 100 UNIT/ML 500 UNITS: 100 SOLUTION at 03:05

## 2017-05-23 RX ADMIN — DIPHENHYDRAMINE HYDROCHLORIDE 50 MG: 50 INJECTION, SOLUTION INTRAMUSCULAR; INTRAVENOUS at 11:05

## 2017-05-23 RX ADMIN — PACLITAXEL 372 MG: 6 INJECTION, SOLUTION INTRAVENOUS at 12:05

## 2017-05-23 RX ADMIN — SODIUM CHLORIDE 16 MG: 9 INJECTION, SOLUTION INTRAVENOUS at 11:05

## 2017-05-23 RX ADMIN — Medication 20 MG: at 11:05

## 2017-05-23 RX ADMIN — SODIUM CHLORIDE: 0.9 INJECTION, SOLUTION INTRAVENOUS at 11:05

## 2017-05-23 RX ADMIN — FAMOTIDINE 20 MG: 20 INJECTION, SOLUTION INTRAVENOUS at 12:05

## 2017-05-23 NOTE — PROGRESS NOTES
Subjective:       Patient ID: Rosaura David is a 66 y.o. female.    Chief Complaint:met breast ca, s/p metastatectomy.   HPI:   A 66-year-old  woman, who I met during the summer of 2015. The patient  came in to see me in the Medical Oncology Clinic for neoadjuvant chemotherapy   for high-grade infiltrating right breast ductal carcinoma with hypermetabolic   lymph nodes along supraclavicular and subdural internal mammary and right   axillary lymph nodes, SUV of 17-18 were noted. She was triple negative at that   time. The patient received Adriamycin and Cytoxan chemotherapy x4 followed by   Taxotere x4. She had some side effects of neuropathy and at the completion, she  had marked improvement of the hypermetabolic breast mass that was reduced to   almost scar like area and all the lymph nodes that were lighting up in multiple   areas had disappeared. The patient then 09/20/2015, underwent four sentinel   lymph node evaluations that were negative for metastases and right breast   partial mastectomy, which showed benign breast tissue with small focus of fat   necrosis from previous biopsy site. No residual carcinoma was noted. The   patient was sent for Radiation Oncology evaluation opted not to have any   adjuvant radiation therapy and was undergoing surveillance. She had a followup   PET scan for surveillance done in October 2015, which showed recurrence with   interval development of extensive hypermetabolic lymphadenopathy within the   right axillary region, right subpectoral region consistent with metastatic   disease. The largest node in the axilla was 4.8 x 3.7 cm with a maximum SUV of   16.3 and the medial aspect of the right breast also had an irregular   hypermetabolic mass 4.9 x 3 cm with a maximum SUV of 4.6. No other metastatic   areas were found and after much debate, the patient reembarked on the same   Adriamycin and Cytoxan x4. At the completion of therapy, the patient showed   complete  clearing of all the disease noted in the lymph nodes and in the breast.  She had laser mastectomy under Dr. Atwood in [place], no residual dz in breast or lymph nodes on path report.   Pt also had the lipoma under the rt arm removed this was in [place].she then underwent adjuvamt xrt with DR. Santiago.  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   the new lesion that DR. Wong  did stereotactic sx at [facility]. New mass has also shown triple negativity   pt has discussed multiple rx options   She had dilatation done of her esophagus because of difficulty swallowing,sent her to EN[facility] for eval of hoarseness, vocal cord paralysis noted and ref to voice rx at [facility].issues  Laboratory:     CBC:  Lab Results   Component Value Date    WBC 4.82 05/22/2017    RBC 4.22 05/22/2017    HGB 12.3 05/22/2017    HCT 36.4 (L) 05/22/2017    MCV 86 05/22/2017    MCH 29.1 05/22/2017    MCHC 33.8 05/22/2017    RDW 15.4 (H) 05/22/2017     05/22/2017    MPV 10.0 05/22/2017    GRAN 3.0 05/22/2017    GRAN 62.4 05/22/2017    LYMPH 0.9 (L) 05/22/2017    LYMPH 18.7 05/22/2017    MONO 0.7 05/22/2017    MONO 15.4 (H) 05/22/2017    EOS 0.1 05/22/2017    BASO 0.03 05/22/2017    EOSINOPHIL 2.9 05/22/2017    BASOPHIL 0.6 05/22/2017       BMP: BMP  Lab Results   Component Value Date     05/22/2017    K 4.1 05/22/2017     05/22/2017    CO2 29 05/22/2017    BUN 12 05/22/2017    CREATININE 0.8 05/22/2017    CALCIUM 9.8 05/22/2017    ANIONGAP 7 (L) 05/22/2017    ESTGFRAFRICA >60.0 05/22/2017    EGFRNONAA >60.0 05/22/2017       LFT:   Lab Results   Component Value Date    ALT 19 05/22/2017    AST 14 05/22/2017     ALKPHOS 82 05/22/2017    BILITOT 0.6 05/22/2017   PET 1/2017 no mets anywhere else      Assessment/Plan:       Tolerated taxol.   Did lose hair   had eyelid and vocal cord evaluation, going for injections to vocal cord on 20th   proceed with taxol, rtc 3 weeks with cbc, cmp   needs PET

## 2017-05-24 ENCOUNTER — PATIENT MESSAGE (OUTPATIENT)
Dept: HEMATOLOGY/ONCOLOGY | Facility: CLINIC | Age: 67
End: 2017-05-24

## 2017-05-30 ENCOUNTER — HOSPITAL ENCOUNTER (OUTPATIENT)
Dept: RADIOLOGY | Facility: HOSPITAL | Age: 67
Discharge: HOME | End: 2017-05-30
Attending: INTERNAL MEDICINE
Payer: MEDICARE

## 2017-05-30 DIAGNOSIS — C79.31 BRAIN METASTASES: ICD-10-CM

## 2017-05-30 DIAGNOSIS — C50.011 MALIGNANT NEOPLASM INVOLVING BOTH NIPPLE AND AREOLA OF RIGHT BREAST IN FEMALE: ICD-10-CM

## 2017-05-30 DIAGNOSIS — C50.011 MALIGNANT NEOPLASM INVOLVING BOTH NIPPLE AND AREOLA OF RIGHT BREAST IN FEMALE: Primary | ICD-10-CM

## 2017-05-30 PROCEDURE — 78815 PET IMAGE W/CT SKULL-THIGH: CPT | Mod: 26,,, | Performed by: RADIOLOGY

## 2017-05-30 PROCEDURE — A9552 F18 FDG: HCPCS | Mod: PO

## 2017-06-02 ENCOUNTER — PATIENT MESSAGE (OUTPATIENT)
Dept: HEMATOLOGY/ONCOLOGY | Facility: CLINIC | Age: 67
End: 2017-06-02

## 2017-06-02 ENCOUNTER — PATIENT MESSAGE (OUTPATIENT)
Dept: NEUROSURGERY | Facility: CLINIC | Age: 67
End: 2017-06-02

## 2017-06-02 ENCOUNTER — TELEPHONE (OUTPATIENT)
Dept: HEMATOLOGY/ONCOLOGY | Facility: CLINIC | Age: 67
End: 2017-06-02

## 2017-06-02 NOTE — TELEPHONE ENCOUNTER
Per Dr. Bangura - ordered MRI/Spine at [facility] for Monday 6/5/17 at 11 am arrival, called patient and informed. Patient voiced understanding and appreciation.    Note: order with IR at [facility] biopsy of L spine mass.

## 2017-06-05 ENCOUNTER — HOSPITAL ENCOUNTER (OUTPATIENT)
Dept: RADIOLOGY | Facility: HOSPITAL | Age: 67
Discharge: HOME | End: 2017-06-05
Attending: INTERNAL MEDICINE
Payer: MEDICARE

## 2017-06-05 ENCOUNTER — HOSPITAL ENCOUNTER (OUTPATIENT)
Dept: RADIOLOGY | Facility: HOSPITAL | Age: 67
Discharge: HOME | End: 2017-06-05
Attending: NEUROLOGICAL SURGERY
Payer: MEDICARE

## 2017-06-05 ENCOUNTER — OFFICE VISIT (OUTPATIENT)
Dept: NEUROSURGERY | Facility: CLINIC | Age: 67
End: 2017-06-05
Payer: MEDICARE

## 2017-06-05 VITALS
SYSTOLIC BLOOD PRESSURE: 131 MMHG | WEIGHT: 202.69 LBS | DIASTOLIC BLOOD PRESSURE: 93 MMHG | BODY MASS INDEX: 33.77 KG/M2 | HEIGHT: 65 IN | HEART RATE: 98 BPM

## 2017-06-05 DIAGNOSIS — Z98.2 S/P VP SHUNT: ICD-10-CM

## 2017-06-05 DIAGNOSIS — Z92.3 STATUS POST GAMMA KNIFE TREATMENT: ICD-10-CM

## 2017-06-05 DIAGNOSIS — Z98.2 VP (VENTRICULOPERITONEAL) SHUNT STATUS: Primary | ICD-10-CM

## 2017-06-05 DIAGNOSIS — C50.011 MALIGNANT NEOPLASM OF NIPPLE OF RIGHT BREAST IN FEMALE: ICD-10-CM

## 2017-06-05 DIAGNOSIS — G91.1 OBSTRUCTIVE HYDROCEPHALUS: ICD-10-CM

## 2017-06-05 DIAGNOSIS — C79.31 BRAIN METASTASES: ICD-10-CM

## 2017-06-05 DIAGNOSIS — H51.23 INO (INTERNUCLEAR OPHTHALMOPLEGIA), BILATERAL: ICD-10-CM

## 2017-06-05 DIAGNOSIS — Z98.890 S/P CRANIOTOMY: ICD-10-CM

## 2017-06-05 DIAGNOSIS — C50.011 MALIGNANT NEOPLASM INVOLVING BOTH NIPPLE AND AREOLA OF RIGHT BREAST IN FEMALE: ICD-10-CM

## 2017-06-05 DIAGNOSIS — R42 DIZZINESS: ICD-10-CM

## 2017-06-05 PROCEDURE — A9585 GADOBUTROL INJECTION: HCPCS | Mod: PO | Performed by: NEUROLOGICAL SURGERY

## 2017-06-05 PROCEDURE — 70553 MRI BRAIN STEM W/O & W/DYE: CPT | Mod: 26,,, | Performed by: RADIOLOGY

## 2017-06-05 PROCEDURE — 1159F MED LIST DOCD IN RCRD: CPT | Mod: S$GLB,,, | Performed by: PHYSICIAN ASSISTANT

## 2017-06-05 PROCEDURE — 70553 MRI BRAIN STEM W/O & W/DYE: CPT | Mod: TC,PO

## 2017-06-05 PROCEDURE — 72158 MRI LUMBAR SPINE W/O & W/DYE: CPT | Mod: 26,,, | Performed by: RADIOLOGY

## 2017-06-05 PROCEDURE — 72158 MRI LUMBAR SPINE W/O & W/DYE: CPT | Mod: TC,PO

## 2017-06-05 PROCEDURE — 99213 OFFICE O/P EST LOW 20 MIN: CPT | Mod: S$GLB,,, | Performed by: PHYSICIAN ASSISTANT

## 2017-06-05 PROCEDURE — 1125F AMNT PAIN NOTED PAIN PRSNT: CPT | Mod: S$GLB,,, | Performed by: PHYSICIAN ASSISTANT

## 2017-06-05 PROCEDURE — 25500020 PHARM REV CODE 255: Mod: PO | Performed by: NEUROLOGICAL SURGERY

## 2017-06-05 RX ORDER — GADOBUTROL 604.72 MG/ML
9 INJECTION INTRAVENOUS
Status: COMPLETED | OUTPATIENT
Start: 2017-06-05 | End: 2017-06-05

## 2017-06-05 RX ADMIN — GADOBUTROL 9 ML: 604.72 INJECTION INTRAVENOUS at 12:06

## 2017-06-06 NOTE — PROGRESS NOTES
Neurosurgery Outpatient Follow Up    Patient ID: Rosaura David is a 66 y.o. female.    Chief Complaint   Patient presents with    Follow-up     SHunt check following MRI. Pt states recent PET scan showed two areas of concern in her Lumbar spine.            Review of Systems   Constitutional: Positive for fatigue. Negative for activity change, appetite change, chills, fever and unexpected weight change.   HENT: Negative for tinnitus, trouble swallowing and voice change.    Eyes: Positive for visual disturbance.   Respiratory: Negative for apnea, cough, chest tightness and shortness of breath.    Cardiovascular: Negative for chest pain and palpitations.   Gastrointestinal: Negative for constipation, diarrhea, nausea and vomiting.   Genitourinary: Negative for difficulty urinating, dysuria, frequency and urgency.   Musculoskeletal: Negative for back pain, gait problem, neck pain and neck stiffness.   Skin: Negative for wound.   Neurological: Positive for headaches. Negative for dizziness, tremors, seizures, facial asymmetry, speech difficulty, weakness, light-headedness and numbness.   Psychiatric/Behavioral: Negative for confusion and decreased concentration.       Past Medical History:   Diagnosis Date    Arthritis     Brainstem tumor     Breast lesion     LEFT    Cancer     breast    HTN (hypertension)     Malignant neoplasm of nipple of right breast     Murmur     Osteopenia     Presence of dental bridge     LOWER    Seasonal allergies     Thyroid disease     Hypothyroidism    Wears glasses      Social History     Social History    Marital status:      Spouse name: N/A    Number of children: N/A    Years of education: N/A     Occupational History    Not on file.     Social History Main Topics    Smoking status: Never Smoker    Smokeless tobacco: Never Used    Alcohol use No    Drug use: No    Sexual activity: Yes     Partners: Male     Birth control/ protection: Post-menopausal, Surgical      Other Topics Concern    Not on file     Social History Narrative    No narrative on file     Family History   Problem Relation Age of Onset    Congenital heart disease Mother     Heart disease Mother     Colon cancer Father     Breast cancer Sister     Macular degeneration Brother     Cancer Maternal Grandmother      heart    Diabetes Paternal Grandfather     Amblyopia Neg Hx     Blindness Neg Hx     Cataracts Neg Hx     Glaucoma Neg Hx     Hypertension Neg Hx     Retinal detachment Neg Hx     Strabismus Neg Hx     Stroke Neg Hx     Thyroid disease Neg Hx      Review of patient's allergies indicates:   Allergen Reactions    Sulfa (sulfonamide antibiotics) Other (See Comments)     Tongue swelling    Adhesive Other (See Comments)     Errythema. Only with surgical tape       Current Outpatient Prescriptions:     alprazolam (XANAX) 1 MG tablet, Take 1 tablet (1 mg total) by mouth nightly as needed for Insomnia., Disp: 20 tablet, Rfl: 0    amlodipine-benazepril 5-20 mg (LOTREL) 5-20 mg per capsule, TAKE 1 CAPSULE EVERY DAY, Disp: 90 capsule, Rfl: 3    azelastine (ASTELIN) 137 mcg nasal spray, 1 spray (137 mcg total) by Nasal route 2 (two) times daily as needed., Disp: 30 mL, Rfl: 3    calcium-vitamin D 500-125 mg-unit tablet, Take 1 tablet by mouth 2 (two) times daily. , Disp: , Rfl:     fexofenadine (ALLEGRA) 30 MG tablet, Take 30 mg by mouth as needed. , Disp: , Rfl:     fluticasone (FLONASE) 50 mcg/actuation nasal spray, , Disp: , Rfl:     hydrochlorothiazide (HYDRODIURIL) 25 MG tablet, Take 1 tablet (25 mg total) by mouth once daily., Disp: 90 tablet, Rfl: 1    lidocaine-prilocaine (EMLA) cream, Apply to affected area once, Disp: 5 g, Rfl: 0    meloxicam (MOBIC) 7.5 MG tablet, as needed. , Disp: , Rfl:     pantoprazole (PROTONIX) 40 MG tablet, Take 20 mg by mouth once daily. , Disp: , Rfl:     prochlorperazine (COMPAZINE) 10 MG tablet, Take 1 tablet (10 mg total) by mouth every 6  "(six) hours as needed., Disp: 90 tablet, Rfl: 6    thyroid, pork, (WP THYROID) 81.25 mg Tab, Take 81.25 mg by mouth once daily., Disp: 90 tablet, Rfl: 1    duloxetine (CYMBALTA) 20 MG capsule, Take 20 mg by mouth once daily., Disp: , Rfl:     ondansetron (ZOFRAN-ODT) 8 MG TbDL, Take 1 tablet (8 mg total) by mouth every 12 (twelve) hours as needed., Disp: 60 tablet, Rfl: 1  Blood pressure (!) 131/93, pulse 98, height 5' 5" (1.651 m), weight 92 kg (202 lb 11.4 oz).      Neurologic Exam     Mental Status   Oriented to person, place, and time.   Follows 3 step commands.   Attention: normal. Concentration: normal.   Speech: speech is normal   Level of consciousness: alert  Knowledge: consistent with education.   Able to name object. Able to read. Able to repeat. Able to write. Normal comprehension.     Cranial Nerves     CN II   Visual acuity: normal  Right visual field deficit: none  Left visual field deficit: none     CN III, IV, VI   Pupils are equal, round, and reactive to light.  Extraocular motions are normal.   Right pupil: Size: 3 mm. Shape: regular. Reactivity: brisk. Consensual response: intact. Accommodation: intact.   Left pupil: Size: 3 mm. Shape: regular. Reactivity: brisk. Consensual response: intact. Accommodation: intact.   CN III: bilateral CN III palsy  Nystagmus: none   Diplopia: bilateral, horizontal and vertical  Ophthalmoparesis: left adduction  Upgaze: abnormal  Downgaze: abnormal  Conjugate gaze: absent    CN V   Right facial sensation deficit: none  Left facial sensation deficit: none    CN VII   Right facial weakness: none  Left facial weakness: none    CN VIII   Hearing: intact    CN IX, X   CN IX normal.   CN X normal.     CN XI   Right sternocleidomastoid strength: normal  Left sternocleidomastoid strength: normal  Right trapezius strength: normal  Left trapezius strength: normal    CN XII   Fasciculations: absent  Tongue deviation: none  Internuclear ophthalmoplegia     Motor Exam "   Muscle bulk: normal  Overall muscle tone: normal  Right arm pronator drift: absent  Left arm pronator drift: absentDeconditioning and 4+/5 gen. motor weakness     Sensory Exam   Light touch normal.   Vibration normal.   Pinprick normal.     Gait, Coordination, and Reflexes     Gait  Gait: wide-based    Coordination   Romberg: negative  Finger to nose coordination: normal  Heel to shin coordination: abnormal  Tandem walking coordination: abnormal    Tremor   Resting tremor: absent  Intention tremor: absent  Action tremor: absent    Reflexes   Right brachioradialis: 1+  Left brachioradialis: 1+  Right biceps: 1+  Left biceps: 1+  Right triceps: 1+  Left triceps: 1+  Right patellar: 1+  Left patellar: 1+  Right achilles: 1+  Left achilles: 1+  Right plantar: normal  Left plantar: normal  Right Naranjo: absent  Left Naranjo: absent  Right ankle clonus: absent  Left ankle clonus: absent      Physical Exam   Constitutional: She is oriented to person, place, and time. She appears well-developed and well-nourished.   HENT:   Head: Normocephalic and atraumatic.   Eyes: EOM are normal. Pupils are equal, round, and reactive to light.   Neck: Normal range of motion. Neck supple.   Cardiovascular: Normal rate and intact distal pulses.    Pulmonary/Chest: Effort normal. No respiratory distress.   Abdominal: Soft. She exhibits no distension.   Musculoskeletal: Normal range of motion. She exhibits no edema or deformity.   Neurological: She is oriented to person, place, and time. She has an abnormal Heel to Shin Test and an abnormal Tandem Gait Test. She has a normal Finger-Nose-Finger Test and a normal Romberg Test.   Reflex Scores:       Tricep reflexes are 1+ on the right side and 1+ on the left side.       Bicep reflexes are 1+ on the right side and 1+ on the left side.       Brachioradialis reflexes are 1+ on the right side and 1+ on the left side.       Patellar reflexes are 1+ on the right side and 1+ on the left side.        Achilles reflexes are 1+ on the right side and 1+ on the left side.  Skin: Skin is warm and dry.   Psychiatric: She has a normal mood and affect. Her speech is normal and behavior is normal. Judgment and thought content normal.   Nursing note and vitals reviewed.      Provider dictation:  Mrs. 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  Her primary cancer treatment is going well and she has started chemotherapy.  Overall she has been feeling well.  She has had dilatation done of her esophagus because of dyspahagia.  She has been seen by ENT for evaluation of hoarseness and vocal cord paralysis was noted.  She is undergoing voice therapy.  She has had decreased headaches, and improved energy levels.     She has had a follow up MRI brain today.  She has also had an MRI lumbar spine performed today as well due to a new lesion seen a L1/2 on her PET scan.  She needs her shunt valve settings checked/ reprogrammed after being exposed to MRI magnetic field.    PET scan from 5-30-17 reveals a new hypermetabolic nodule deep to the posterior upper cervical occipital muscles which is suspicious for metastatic disease.  And a new hypermetabolic nodular focus within the spinal canal at the L1-2 level suspicious for a new intramedullary metastasis.     Her shunt valve setting was checked today and found to be at 0.5.  It was reprogrammed to the original setting of 1.5 and confirmed with a re-check of the valve setting.      Dr. Wong will review her films and give further recommendation on his recommended plan of care.       Visit Diagnosis:   (ventriculoperitoneal) shunt status    Brain  metastases    S/P  shunt    S/P craniotomy    Status post gamma knife treatment

## 2017-06-07 ENCOUNTER — PATIENT MESSAGE (OUTPATIENT)
Dept: HEMATOLOGY/ONCOLOGY | Facility: CLINIC | Age: 67
End: 2017-06-07

## 2017-06-09 ENCOUNTER — PATIENT MESSAGE (OUTPATIENT)
Dept: HEMATOLOGY/ONCOLOGY | Facility: CLINIC | Age: 67
End: 2017-06-09

## 2017-06-10 ENCOUNTER — PATIENT MESSAGE (OUTPATIENT)
Dept: HEMATOLOGY/ONCOLOGY | Facility: CLINIC | Age: 67
End: 2017-06-10

## 2017-06-10 ENCOUNTER — PATIENT MESSAGE (OUTPATIENT)
Dept: NEUROSURGERY | Facility: CLINIC | Age: 67
End: 2017-06-10

## 2017-06-10 ENCOUNTER — DOCUMENTATION ONLY (OUTPATIENT)
Dept: HEMATOLOGY/ONCOLOGY | Facility: CLINIC | Age: 67
End: 2017-06-10

## 2017-06-12 ENCOUNTER — TELEPHONE (OUTPATIENT)
Dept: HEMATOLOGY/ONCOLOGY | Facility: CLINIC | Age: 67
End: 2017-06-12

## 2017-06-12 ENCOUNTER — TELEPHONE (OUTPATIENT)
Dept: NEUROSURGERY | Facility: CLINIC | Age: 67
End: 2017-06-12

## 2017-06-12 DIAGNOSIS — G95.9 SPINAL CORD LESION: Primary | ICD-10-CM

## 2017-06-12 PROBLEM — D49.7 SPINAL CORD TUMOR: Status: ACTIVE | Noted: 2017-06-12

## 2017-06-12 NOTE — TELEPHONE ENCOUNTER
"I have called the patient and discussed with her Dr. Elkins recommendation of resection over biopsy.  She voiced understanding.  I advised that Dr. Wong did recommend for "immediate resection".  She states her  is out of town and she cannot get to the hospital until tmoorrow.  She did give me permission to speak with her  and explain the situation to him.  He will not be available to speak until around 10:00 am (per Ms. Rosaura).  I called him and left a message asking him to call back and I will try calling back as well at 10:00 am.  "

## 2017-06-12 NOTE — TELEPHONE ENCOUNTER
Called and spoke with pro at [facility] radiology, order placed for bx. Called and spoke with patient's  he would like to discuss dr. fonseca doing procedure instead of radiology dept. Please call and advise number is [phone].

## 2017-06-12 NOTE — TELEPHONE ENCOUNTER
"----- Message from Nguyễn Wong MD sent at 6/9/2017  9:04 PM CDT -----  Regarding: spinal lesions  Caron and Leanna    This lady has new intradural drop mets the largest of which can paralyze her if untreated. I would recommend immediate resection. We can direct admit her at [facility]. "Biopsy" is as much trouble are complete resection.    S  "

## 2017-06-12 NOTE — TELEPHONE ENCOUNTER
I have spoken with Ajith David ().  He understands the need for surgical resection and can have Ms. Kaplan to [facility] around 3:00 or 4:00 this afternoon.  I am working with Angie Juarez and rell to arrange for direct admission and will let him know the time to be at the hospital.  He voiced understanding.     I have forwarded this message to Dr. Wong, Dr. Bangura and Dr. Bangura's staff to make them aware of what is going on.

## 2017-06-12 NOTE — TELEPHONE ENCOUNTER
----- Message from Areli Johnson sent at 6/12/2017 10:04 AM CDT -----  Contact:  - Ajith David  States that Dr Wong would like to do surgery on her back and has questions whether this can be done or not due to her condition.  Can you please call Ajith back at [phone].  Thank you

## 2017-06-15 PROBLEM — D49.7 INTRADURAL EXTRAMEDULLARY SPINAL TUMOR: Status: ACTIVE | Noted: 2017-06-15

## 2017-06-16 ENCOUNTER — PATIENT MESSAGE (OUTPATIENT)
Dept: HEMATOLOGY/ONCOLOGY | Facility: CLINIC | Age: 67
End: 2017-06-16

## 2017-06-19 ENCOUNTER — PATIENT MESSAGE (OUTPATIENT)
Dept: HEMATOLOGY/ONCOLOGY | Facility: CLINIC | Age: 67
End: 2017-06-19

## 2017-06-19 ENCOUNTER — TELEPHONE (OUTPATIENT)
Dept: INFUSION THERAPY | Facility: HOSPITAL | Age: 67
End: 2017-06-19

## 2017-06-19 ENCOUNTER — TELEPHONE (OUTPATIENT)
Dept: NEUROSURGERY | Facility: CLINIC | Age: 67
End: 2017-06-19

## 2017-06-19 ENCOUNTER — TELEPHONE (OUTPATIENT)
Dept: HEMATOLOGY/ONCOLOGY | Facility: CLINIC | Age: 67
End: 2017-06-19

## 2017-06-19 NOTE — TELEPHONE ENCOUNTER
In regards to emails from patient's [employer] account, called and spoke with patient's . Mr. David has questions regarding patient's chemotherapy, [facility] referrral, coordination of above plus surgery and radiation. I explained to Mr. David that will seek advisement of Dr. Bangura. Mr. David stated that tomorrow will be okay to get back to him as patient remains in patient at [facility], hopefully being discharged today.   Mr. David gave the following information:  Hopefully will be discharged today 6/19/17.  Gamma knife planned for Friday 6/30/17  Dr. Wong wants radiation started 4 weeks post surgery.  Also wants referral to [facility].    Will discuss with Dr. Bangura and get back to Mr. David tomorrow. Mr. David voiced understanding and appreciation.

## 2017-06-19 NOTE — TELEPHONE ENCOUNTER
Received call from KATHIE Garcia LPN per Dr. Bangura to cancel all future chemo appts and office will let us know if and when to resume

## 2017-06-21 ENCOUNTER — TELEPHONE (OUTPATIENT)
Dept: NEUROSURGERY | Facility: CLINIC | Age: 67
End: 2017-06-21

## 2017-06-21 DIAGNOSIS — F41.9 ANXIETY: ICD-10-CM

## 2017-06-21 DIAGNOSIS — F32.A DEPRESSION: ICD-10-CM

## 2017-06-21 NOTE — TELEPHONE ENCOUNTER
Attempted to contact patient to inform them of referral sent to [facility] in [place]. No answer. Left voicemail.

## 2017-06-21 NOTE — TELEPHONE ENCOUNTER
----- Message from FERDINAND Valentine sent at 6/21/2017  2:25 PM CDT -----  Referral placed for rad/onc. Patient to not start radiation for spinal mets until at least 4 weeks post-op.    Thanks!    ----- Message -----  From: Brenda Davis LPN  Sent: 6/20/2017   5:48 PM  To: FERDINAND Valentine    Spoke with patient's . He states we were supposed to enter a referral for him to start radiation on her spine? Let me know who we need to refer her to and I'll get it entered. Thanks!  ----- Message -----  From: FERDINAND Valentine  Sent: 6/19/2017   2:10 PM  To: Marvin Reyna!    Mrs. David also need an MRI lumbar spine w/ and w/o scheduled for her 4 week post-op. I put the order in.    Thanks!    -Tiff

## 2017-06-22 RX ORDER — DULOXETIN HYDROCHLORIDE 20 MG/1
CAPSULE, DELAYED RELEASE ORAL
Qty: 30 CAPSULE | Refills: 1 | Status: SHIPPED | OUTPATIENT
Start: 2017-06-22 | End: 2017-07-07 | Stop reason: SDUPTHER

## 2017-06-22 RX ORDER — ALPRAZOLAM 1 MG/1
1 TABLET ORAL NIGHTLY PRN
Qty: 20 TABLET | Refills: 0 | Status: SHIPPED | OUTPATIENT
Start: 2017-06-22

## 2017-06-23 ENCOUNTER — TELEPHONE (OUTPATIENT)
Dept: NEUROSURGERY | Facility: CLINIC | Age: 67
End: 2017-06-23

## 2017-06-23 NOTE — TELEPHONE ENCOUNTER
Referral sent to [facility]. Patient's  aware of referral. Informed him that he should expect to receive a call from their office within the next few days. Verbalized understanding.

## 2017-06-29 ENCOUNTER — OFFICE VISIT (OUTPATIENT)
Dept: NEUROSURGERY | Facility: CLINIC | Age: 67
End: 2017-06-29
Payer: MEDICARE

## 2017-06-29 VITALS
HEIGHT: 65 IN | BODY MASS INDEX: 34.82 KG/M2 | RESPIRATION RATE: 17 BRPM | SYSTOLIC BLOOD PRESSURE: 120 MMHG | HEART RATE: 90 BPM | TEMPERATURE: 98 F | DIASTOLIC BLOOD PRESSURE: 77 MMHG | WEIGHT: 209 LBS

## 2017-06-29 DIAGNOSIS — Z98.2 S/P VP SHUNT: ICD-10-CM

## 2017-06-29 DIAGNOSIS — Z98.890 S/P CRANIOTOMY: ICD-10-CM

## 2017-06-29 DIAGNOSIS — Z92.3 STATUS POST GAMMA KNIFE TREATMENT: Primary | ICD-10-CM

## 2017-06-29 PROCEDURE — 99024 POSTOP FOLLOW-UP VISIT: CPT | Mod: S$GLB,,, | Performed by: PHYSICIAN ASSISTANT

## 2017-06-30 ENCOUNTER — TELEPHONE (OUTPATIENT)
Dept: HEMATOLOGY/ONCOLOGY | Facility: CLINIC | Age: 67
End: 2017-06-30

## 2017-06-30 NOTE — TELEPHONE ENCOUNTER
Per patient request and ok'd by Dr. Bangura - faxed genetics testsing (BRAC Analysis) results to patient's daughter's doctor, Dr. Raul Marino at [phone].

## 2017-07-01 ENCOUNTER — PATIENT MESSAGE (OUTPATIENT)
Dept: ENDOCRINOLOGY | Facility: CLINIC | Age: 67
End: 2017-07-01

## 2017-07-01 NOTE — PROGRESS NOTES
Neurosurgery Outpatient Follow Up    Patient ID: Rosaura David is a 67 y.o. female.    No chief complaint on file.          Review of Systems   Constitutional: Positive for fatigue. Negative for activity change, appetite change, chills, fever and unexpected weight change.   HENT: Negative for tinnitus, trouble swallowing and voice change.    Eyes: Positive for visual disturbance.   Respiratory: Negative for apnea, cough, chest tightness and shortness of breath.    Cardiovascular: Negative for chest pain and palpitations.   Gastrointestinal: Negative for constipation, diarrhea, nausea and vomiting.   Genitourinary: Negative for difficulty urinating, dysuria, frequency and urgency.   Musculoskeletal: Negative for back pain, gait problem, neck pain and neck stiffness.   Skin: Negative for wound.   Neurological: Positive for headaches. Negative for dizziness, tremors, seizures, facial asymmetry, speech difficulty, weakness, light-headedness and numbness.   Psychiatric/Behavioral: Negative for confusion and decreased concentration.       Past Medical History:   Diagnosis Date    Arthritis     Brainstem tumor     Breast lesion     LEFT    Cancer     breast    HTN (hypertension)     Malignant neoplasm of nipple of right breast     Murmur     Osteopenia     Presence of dental bridge     LOWER    Seasonal allergies     Thyroid disease     Hypothyroidism    Wears glasses      Social History     Social History    Marital status:      Spouse name: N/A    Number of children: N/A    Years of education: N/A     Occupational History    Not on file.     Social History Main Topics    Smoking status: Never Smoker    Smokeless tobacco: Never Used    Alcohol use No    Drug use: No    Sexual activity: Yes     Partners: Male     Birth control/ protection: Post-menopausal, Surgical     Other Topics Concern    Not on file     Social History Narrative    No narrative on file     Family History   Problem Relation  Age of Onset    Congenital heart disease Mother     Heart disease Mother     Colon cancer Father     Breast cancer Sister     Macular degeneration Brother     Cancer Maternal Grandmother      heart    Diabetes Paternal Grandfather     Amblyopia Neg Hx     Blindness Neg Hx     Cataracts Neg Hx     Glaucoma Neg Hx     Hypertension Neg Hx     Retinal detachment Neg Hx     Strabismus Neg Hx     Stroke Neg Hx     Thyroid disease Neg Hx      Review of patient's allergies indicates:   Allergen Reactions    Sulfa (sulfonamide antibiotics) Other (See Comments)     Tongue swelling    Adhesive Other (See Comments)     Errythema. Only with surgical tape       Current Outpatient Prescriptions:     alprazolam (XANAX) 1 MG tablet, TAKE 1 TABLET (1 MG TOTAL) BY MOUTH NIGHTLY AS NEEDED FOR INSOMNIA., Disp: 20 tablet, Rfl: 0    amlodipine-benazepril 5-20 mg (LOTREL) 5-20 mg per capsule, TAKE 1 CAPSULE EVERY DAY, Disp: 90 capsule, Rfl: 3    azelastine (ASTELIN) 137 mcg nasal spray, 1 spray (137 mcg total) by Nasal route 2 (two) times daily as needed., Disp: 30 mL, Rfl: 3    calcium-vitamin D 500-125 mg-unit tablet, Take 1 tablet by mouth 2 (two) times daily. , Disp: , Rfl:     duloxetine (CYMBALTA) 20 MG capsule, Take 20 mg by mouth once daily., Disp: , Rfl:     duloxetine (CYMBALTA) 20 MG capsule, TAKE ONE CAPSULE BY MOUTH EVERY DAY, Disp: 30 capsule, Rfl: 1    fexofenadine (ALLEGRA) 30 MG tablet, Take 30 mg by mouth as needed. , Disp: , Rfl:     fluticasone (FLONASE) 50 mcg/actuation nasal spray, , Disp: , Rfl:     hydrochlorothiazide (HYDRODIURIL) 25 MG tablet, Take 1 tablet (25 mg total) by mouth once daily., Disp: 90 tablet, Rfl: 1    lidocaine-prilocaine (EMLA) cream, Apply to affected area once, Disp: 5 g, Rfl: 0    ondansetron (ZOFRAN-ODT) 8 MG TbDL, Take 1 tablet (8 mg total) by mouth every 12 (twelve) hours as needed., Disp: 60 tablet, Rfl: 1    oxycodone-acetaminophen (PERCOCET) 7.5-325 mg per  "tablet, Take 1 tablet by mouth every 4 (four) hours as needed for Pain., Disp: 60 tablet, Rfl: 0    pantoprazole (PROTONIX) 40 MG tablet, Take 20 mg by mouth once daily. , Disp: , Rfl:     prochlorperazine (COMPAZINE) 10 MG tablet, Take 1 tablet (10 mg total) by mouth every 6 (six) hours as needed., Disp: 90 tablet, Rfl: 6    prochlorperazine (COMPAZINE) 10 MG tablet, Take 1 tablet (10 mg total) by mouth every 6 (six) hours as needed (nausea)., Disp: 20 tablet, Rfl: 0    thyroid, pork, (WP THYROID) 81.25 mg Tab, Take 81.25 mg by mouth once daily., Disp: 90 tablet, Rfl: 1  Blood pressure 120/77, pulse 90, temperature 98.4 °F (36.9 °C), resp. rate 17, height 5' 5" (1.651 m), weight 94.8 kg (208 lb 15.9 oz).      Neurologic Exam     Mental Status   Oriented to person, place, and time.   Follows 3 step commands.   Attention: normal. Concentration: normal.   Speech: speech is normal   Level of consciousness: alert  Knowledge: consistent with education.   Able to name object. Able to read. Able to repeat. Able to write. Normal comprehension.     Cranial Nerves     CN II   Visual acuity: normal  Right visual field deficit: none  Left visual field deficit: none     CN III, IV, VI   Pupils are equal, round, and reactive to light.  Extraocular motions are normal.   Right pupil: Size: 3 mm. Shape: regular. Reactivity: brisk. Consensual response: intact. Accommodation: intact.   Left pupil: Size: 3 mm. Shape: regular. Reactivity: brisk. Consensual response: intact. Accommodation: intact.   CN III: bilateral CN III palsy  Nystagmus: none   Diplopia: bilateral, horizontal and vertical  Ophthalmoparesis: left adduction  Upgaze: abnormal  Downgaze: abnormal  Conjugate gaze: absent    CN V   Right facial sensation deficit: none  Left facial sensation deficit: none    CN VII   Right facial weakness: none  Left facial weakness: none    CN VIII   Hearing: intact    CN IX, X   CN IX normal.   CN X normal.     CN XI   Right " sternocleidomastoid strength: normal  Left sternocleidomastoid strength: normal  Right trapezius strength: normal  Left trapezius strength: normal    CN XII   Fasciculations: absent  Tongue deviation: none  Internuclear ophthalmoplegia     Motor Exam   Muscle bulk: normal  Overall muscle tone: normal  Right arm pronator drift: absent  Left arm pronator drift: absentDeconditioning and 4+/5 gen. motor weakness     Sensory Exam   Light touch normal.   Vibration normal.   Pinprick normal.     Gait, Coordination, and Reflexes     Gait  Gait: wide-based    Coordination   Romberg: negative  Finger to nose coordination: normal  Heel to shin coordination: abnormal  Tandem walking coordination: abnormal    Tremor   Resting tremor: absent  Intention tremor: absent  Action tremor: absent    Reflexes   Right brachioradialis: 1+  Left brachioradialis: 1+  Right biceps: 1+  Left biceps: 1+  Right triceps: 1+  Left triceps: 1+  Right patellar: 1+  Left patellar: 1+  Right achilles: 1+  Left achilles: 1+  Right plantar: normal  Left plantar: normal  Right Naranjo: absent  Left Naranjo: absent  Right ankle clonus: absent  Left ankle clonus: absent      Physical Exam   Constitutional: She is oriented to person, place, and time. She appears well-developed and well-nourished.   HENT:   Head: Normocephalic and atraumatic.   Eyes: EOM are normal. Pupils are equal, round, and reactive to light.   Neck: Normal range of motion. Neck supple.   Cardiovascular: Normal rate and intact distal pulses.    Pulmonary/Chest: Effort normal. No respiratory distress.   Abdominal: Soft. She exhibits no distension.   Musculoskeletal: Normal range of motion. She exhibits no edema or deformity.   Neurological: She is oriented to person, place, and time. She has an abnormal Heel to Shin Test and an abnormal Tandem Gait Test. She has a normal Finger-Nose-Finger Test and a normal Romberg Test.   Reflex Scores:       Tricep reflexes are 1+ on the right side and  1+ on the left side.       Bicep reflexes are 1+ on the right side and 1+ on the left side.       Brachioradialis reflexes are 1+ on the right side and 1+ on the left side.       Patellar reflexes are 1+ on the right side and 1+ on the left side.       Achilles reflexes are 1+ on the right side and 1+ on the left side.  Skin: Skin is warm and dry.   Psychiatric: She has a normal mood and affect. Her speech is normal and behavior is normal. Judgment and thought content normal.   Nursing note and vitals reviewed.      Provider dictation:  Mrs. 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  A May 2017 PET scan and lumbar MRI revealed new lumbar lesion that was removed 6-12-17 through an L1-:3 laminectomy.  She is recovering well from surgery with incision healed.she is scheduled to have a lumbar MRI 4 weeks postt op.    She had further gamma knife radiosurgery yesterday and presents to have her shunt setting checked.  She complains of a tightening headache that increased with laying down to sleep.  She states these headaches typically last 3-4 days after each setting check and then resolves.  Her shunt has been set at 1.5.  I discussed the headaches with Dr. Wong.  AT this time he feels the shunt may no longer be necceasry and we can try to close the shunt valve to essentially turn it off and see if the headaches improve with no further complications.  If the shunt is not needed, removing it can be considered prior to any needed further gamma knife procedures as it interferes with  MRI during radiosurgery.      Her shunt valve setting was checked today and reprogrammed to 2.5.  She will call  if she develops any headaches.    She should follow up with Dr. Wong in clinic at 4 weeks post op from lumbar surgery.      Visit Diagnosis:  Status post gamma knife treatment    S/P  shunt    S/P craniotomy

## 2017-07-05 ENCOUNTER — LAB VISIT (OUTPATIENT)
Dept: LAB | Facility: HOSPITAL | Age: 67
End: 2017-07-05
Attending: INTERNAL MEDICINE
Payer: MEDICARE

## 2017-07-05 DIAGNOSIS — E03.9 HYPOTHYROIDISM, UNSPECIFIED TYPE: ICD-10-CM

## 2017-07-05 LAB
T3FREE SERPL-MCNC: 3.9 PG/ML
T4 FREE SERPL-MCNC: 1.08 NG/DL
TSH SERPL DL<=0.005 MIU/L-ACNC: 1.17 UIU/ML

## 2017-07-05 PROCEDURE — 84439 ASSAY OF FREE THYROXINE: CPT

## 2017-07-05 PROCEDURE — 36415 COLL VENOUS BLD VENIPUNCTURE: CPT | Mod: PO

## 2017-07-05 PROCEDURE — 84443 ASSAY THYROID STIM HORMONE: CPT

## 2017-07-05 PROCEDURE — 84481 FREE ASSAY (FT-3): CPT

## 2017-07-07 ENCOUNTER — OFFICE VISIT (OUTPATIENT)
Dept: ENDOCRINOLOGY | Facility: CLINIC | Age: 67
End: 2017-07-07
Payer: MEDICARE

## 2017-07-07 VITALS
HEIGHT: 65 IN | SYSTOLIC BLOOD PRESSURE: 118 MMHG | WEIGHT: 194.69 LBS | BODY MASS INDEX: 32.44 KG/M2 | HEART RATE: 82 BPM | DIASTOLIC BLOOD PRESSURE: 66 MMHG

## 2017-07-07 DIAGNOSIS — E03.9 HYPOTHYROIDISM, UNSPECIFIED TYPE: Primary | ICD-10-CM

## 2017-07-07 DIAGNOSIS — C50.919 METASTATIC BREAST CANCER: ICD-10-CM

## 2017-07-07 DIAGNOSIS — E04.2 MULTINODULAR GOITER: ICD-10-CM

## 2017-07-07 PROCEDURE — 1159F MED LIST DOCD IN RCRD: CPT | Mod: S$GLB,,, | Performed by: INTERNAL MEDICINE

## 2017-07-07 PROCEDURE — 99214 OFFICE O/P EST MOD 30 MIN: CPT | Mod: S$GLB,,, | Performed by: INTERNAL MEDICINE

## 2017-07-07 PROCEDURE — 99999 PR PBB SHADOW E&M-EST. PATIENT-LVL III: CPT | Mod: PBBFAC,,, | Performed by: INTERNAL MEDICINE

## 2017-07-07 NOTE — PROGRESS NOTES
CHIEF COMPLAINT: Thyroid nodules/Hypothyroid  67 year old female here for f/u. S/P benign Left FNA 4/10, 2011, 2012. Was on WP Thyroid 81.25 mg. Scheduled to see radiation oncologist. No palpitations. No tremors. No diarrhea or constipation. She does have some pain post surgery. Has had multiple surgeries for metastatic breast CA           PAST MEDICAL HISTORY: Osteopenia. GERD, hypertension    PAST SURGICAL HISTORY: BLAINE/BSO    SOCIAL HISTORY: She does not smoke or drink. Teaches [employer]    FAMILY HISTORY: Hypothyroidism, Breast cancer.     MEDICATIONS/ALLERGIES: The patient's MedCard has been updated and reviewed.     ROS:   Constitutional: No recent significant weight change  Eyes: No recent visual changes  ENT: No dysphagia  Cardiovascular: Denies current anginal symptoms  Respiratory: Denies current respiratory difficulty  Gastrointestinal: Denies recent bowel disturbances  GenitoUrinary - No dysuria  Skin: No new skin rash  Neurologic: No focal neurologic complaints  Remainder ROS negative        PE:  GENERAL: Well developed, well nourished  NECK: Supple neck, large left nodule palpable  LYMPHATIC: No cervical or supraclavicular lymphadenopathy  CARDIOVASCULAR: Normal heart sounds, no pedal edema  RESPIRATORY: Normal effort, clear to auscultation    Results for MARIAMA SCHWARTZ (MRN [id number]) as of 7/7/2017 10:22   Ref. Range 7/5/2017 10:07   TSH Latest Ref Range: 0.400 - 4.000 uIU/mL 1.172   T3, Free Latest Ref Range: 2.3 - 4.2 pg/mL 3.9   Free T4 Latest Ref Range: 0.71 - 1.51 ng/dL 1.08         ASSESSMENT/PLAN:  1. Multinodular goiter-dominant nodule of left lobe.  PET scan does not show increased activity in the thyroid, however does not r/o malignancy in the thyroid. AT this point with multiple PET scan and no obstructive symptoms can hold off on serial US at this time.     2. Osteopenia- no fractures. Taking Ca + D.     3. Hypothyroid- TSH WNL.     4. Metastatic Breast CA- see # 1    FOLLOWUP  F/U 1 year with  TSH, Ft4, Ft3

## 2017-07-10 ENCOUNTER — DOCUMENTATION ONLY (OUTPATIENT)
Dept: FAMILY MEDICINE | Facility: CLINIC | Age: 67
End: 2017-07-10

## 2017-07-19 ENCOUNTER — HOSPITAL ENCOUNTER (OUTPATIENT)
Dept: RADIOLOGY | Facility: HOSPITAL | Age: 67
Discharge: HOME | End: 2017-07-19
Attending: NEUROLOGICAL SURGERY
Payer: MEDICARE

## 2017-07-19 ENCOUNTER — OFFICE VISIT (OUTPATIENT)
Dept: NEUROSURGERY | Facility: CLINIC | Age: 67
End: 2017-07-19
Payer: MEDICARE

## 2017-07-19 VITALS
BODY MASS INDEX: 32.44 KG/M2 | HEART RATE: 108 BPM | SYSTOLIC BLOOD PRESSURE: 109 MMHG | DIASTOLIC BLOOD PRESSURE: 76 MMHG | WEIGHT: 194.69 LBS | HEIGHT: 65 IN

## 2017-07-19 DIAGNOSIS — M54.50 LOW BACK PAIN ASSOCIATED WITH A SPINAL DISORDER OTHER THAN RADICULOPATHY OR SPINAL STENOSIS: ICD-10-CM

## 2017-07-19 DIAGNOSIS — G95.29 SPINAL CORD COMPRESSION DUE TO MALIGNANT NEOPLASM METASTATIC TO SPINE: ICD-10-CM

## 2017-07-19 DIAGNOSIS — D49.7 INTRADURAL EXTRAMEDULLARY SPINAL TUMOR: ICD-10-CM

## 2017-07-19 DIAGNOSIS — M54.9 BACK PAIN, UNSPECIFIED BACK LOCATION, UNSPECIFIED BACK PAIN LATERALITY, UNSPECIFIED CHRONICITY: ICD-10-CM

## 2017-07-19 DIAGNOSIS — M79.604 LEG PAIN, BILATERAL: ICD-10-CM

## 2017-07-19 DIAGNOSIS — M79.605 LEG PAIN, BILATERAL: ICD-10-CM

## 2017-07-19 DIAGNOSIS — D49.6 BRAINSTEM TUMOR: Primary | ICD-10-CM

## 2017-07-19 DIAGNOSIS — C79.51 SPINAL CORD COMPRESSION DUE TO MALIGNANT NEOPLASM METASTATIC TO SPINE: ICD-10-CM

## 2017-07-19 DIAGNOSIS — D49.2 LUMBAR SPINE TUMOR: ICD-10-CM

## 2017-07-19 PROCEDURE — 99024 POSTOP FOLLOW-UP VISIT: CPT | Mod: S$GLB,,, | Performed by: NEUROLOGICAL SURGERY

## 2017-07-19 PROCEDURE — 72158 MRI LUMBAR SPINE W/O & W/DYE: CPT | Mod: TC,PO

## 2017-07-19 PROCEDURE — A9585 GADOBUTROL INJECTION: HCPCS | Mod: PO | Performed by: NEUROLOGICAL SURGERY

## 2017-07-19 PROCEDURE — 72158 MRI LUMBAR SPINE W/O & W/DYE: CPT | Mod: 26,,, | Performed by: RADIOLOGY

## 2017-07-19 PROCEDURE — 25500020 PHARM REV CODE 255: Mod: PO | Performed by: NEUROLOGICAL SURGERY

## 2017-07-19 RX ORDER — GADOBUTROL 604.72 MG/ML
8 INJECTION INTRAVENOUS
Status: COMPLETED | OUTPATIENT
Start: 2017-07-19 | End: 2017-07-19

## 2017-07-19 RX ORDER — OXYCODONE HYDROCHLORIDE 20 MG/1
TABLET, FILM COATED, EXTENDED RELEASE ORAL
COMMUNITY
Start: 2017-07-07 | End: 2017-09-05

## 2017-07-19 RX ADMIN — GADOBUTROL 8 ML: 604.72 INJECTION INTRAVENOUS at 02:07

## 2017-07-20 ENCOUNTER — TELEPHONE (OUTPATIENT)
Dept: PAIN MEDICINE | Facility: CLINIC | Age: 67
End: 2017-07-20

## 2017-07-20 NOTE — TELEPHONE ENCOUNTER
----- Message from Sarina Perdomo MA sent at 7/20/2017  9:08 AM CDT -----  Dr. Wong would like for Ms. Joann to be seen by Dr. Hernandez. Please schedule patient.     Thanks!

## 2017-07-31 ENCOUNTER — PATIENT MESSAGE (OUTPATIENT)
Dept: HEMATOLOGY/ONCOLOGY | Facility: CLINIC | Age: 67
End: 2017-07-31

## 2017-07-31 NOTE — PROGRESS NOTES
Neurosurgery Outpatient Follow Up    Patient ID: Rosaura David is a 67 y.o. female.    Chief Complaint   Patient presents with    Post-op Evaluation     gamma knife           Review of Systems   Constitutional: Positive for fatigue. Negative for activity change, appetite change, chills, fever and unexpected weight change.   HENT: Negative for tinnitus, trouble swallowing and voice change.    Eyes: Positive for visual disturbance.   Respiratory: Negative for apnea, cough, chest tightness and shortness of breath.    Cardiovascular: Negative for chest pain and palpitations.   Gastrointestinal: Negative for constipation, diarrhea, nausea and vomiting.   Genitourinary: Negative for difficulty urinating, dysuria, frequency and urgency.   Musculoskeletal: Positive for back pain and gait problem. Negative for neck pain and neck stiffness.        Leg pain right worse than left   Skin: Negative for wound.   Neurological: Negative for dizziness, tremors, seizures, facial asymmetry, speech difficulty, weakness, light-headedness and numbness.   Psychiatric/Behavioral: Negative for confusion and decreased concentration.       Past Medical History:   Diagnosis Date    Arthritis     Brainstem tumor     Breast lesion     LEFT    Cancer     breast    HTN (hypertension)     Malignant neoplasm of nipple of right breast     Murmur     Osteopenia     Presence of dental bridge     LOWER    Seasonal allergies     Thyroid disease     Hypothyroidism    Wears glasses      Social History     Social History    Marital status:      Spouse name: N/A    Number of children: N/A    Years of education: N/A     Occupational History    Not on file.     Social History Main Topics    Smoking status: Never Smoker    Smokeless tobacco: Never Used    Alcohol use No    Drug use: No    Sexual activity: Yes     Partners: Male     Birth control/ protection: Post-menopausal, Surgical     Other Topics Concern    Not on file     Social  History Narrative    No narrative on file     Family History   Problem Relation Age of Onset    Congenital heart disease Mother     Heart disease Mother     Colon cancer Father     Breast cancer Sister     Macular degeneration Brother     Cancer Maternal Grandmother      heart    Diabetes Paternal Grandfather     Amblyopia Neg Hx     Blindness Neg Hx     Cataracts Neg Hx     Glaucoma Neg Hx     Hypertension Neg Hx     Retinal detachment Neg Hx     Strabismus Neg Hx     Stroke Neg Hx     Thyroid disease Neg Hx      Review of patient's allergies indicates:   Allergen Reactions    Sulfa (sulfonamide antibiotics) Other (See Comments)     Tongue swelling    Adhesive Other (See Comments)     Errythema. Only with surgical tape       Current Outpatient Prescriptions:     alprazolam (XANAX) 1 MG tablet, TAKE 1 TABLET (1 MG TOTAL) BY MOUTH NIGHTLY AS NEEDED FOR INSOMNIA., Disp: 20 tablet, Rfl: 0    amlodipine-benazepril 5-20 mg (LOTREL) 5-20 mg per capsule, TAKE 1 CAPSULE EVERY DAY, Disp: 90 capsule, Rfl: 3    azelastine (ASTELIN) 137 mcg nasal spray, 1 spray (137 mcg total) by Nasal route 2 (two) times daily as needed., Disp: 30 mL, Rfl: 3    calcium-vitamin D 500-125 mg-unit tablet, Take 1 tablet by mouth 2 (two) times daily. , Disp: , Rfl:     duloxetine (CYMBALTA) 20 MG capsule, Take 20 mg by mouth once daily., Disp: , Rfl:     fexofenadine (ALLEGRA) 30 MG tablet, Take 30 mg by mouth as needed. , Disp: , Rfl:     fluticasone (FLONASE) 50 mcg/actuation nasal spray, , Disp: , Rfl:     hydrochlorothiazide (HYDRODIURIL) 25 MG tablet, Take 1 tablet (25 mg total) by mouth once daily., Disp: 90 tablet, Rfl: 1    lidocaine-prilocaine (EMLA) cream, Apply to affected area once, Disp: 5 g, Rfl: 0    ondansetron (ZOFRAN-ODT) 8 MG TbDL, Take 1 tablet (8 mg total) by mouth every 12 (twelve) hours as needed., Disp: 60 tablet, Rfl: 1    oxycodone-acetaminophen (PERCOCET) 7.5-325 mg per tablet, Take 1 tablet  "by mouth every 4 (four) hours as needed for Pain., Disp: 60 tablet, Rfl: 0    OXYCONTIN 20 mg 12 hr tablet, , Disp: , Rfl:     pantoprazole (PROTONIX) 40 MG tablet, Take 20 mg by mouth once daily. , Disp: , Rfl:     prochlorperazine (COMPAZINE) 10 MG tablet, Take 1 tablet (10 mg total) by mouth every 6 (six) hours as needed (nausea)., Disp: 20 tablet, Rfl: 0    thyroid, pork, (WP THYROID) 81.25 mg Tab, Take 81.25 mg by mouth once daily., Disp: 90 tablet, Rfl: 3  Blood pressure 109/76, pulse 108, height 5' 5" (1.651 m), weight 88.3 kg (194 lb 10.7 oz).      Neurologic Exam     Mental Status   Oriented to person, place, and time.   Follows 3 step commands.   Attention: normal. Concentration: normal.   Speech: speech is normal   Level of consciousness: alert  Knowledge: consistent with education.   Able to name object. Able to read. Able to repeat. Able to write. Normal comprehension.     Cranial Nerves     CN II   Visual acuity: normal  Right visual field deficit: none  Left visual field deficit: none     CN III, IV, VI   Pupils are equal, round, and reactive to light.  Extraocular motions are normal.   Right pupil: Size: 3 mm. Shape: regular. Reactivity: brisk. Consensual response: intact. Accommodation: intact.   Left pupil: Size: 3 mm. Shape: regular. Reactivity: brisk. Consensual response: intact. Accommodation: intact.   CN III: bilateral CN III palsy  Nystagmus: none   Diplopia: bilateral, horizontal and vertical  Ophthalmoparesis: left adduction  Upgaze: abnormal  Downgaze: abnormal  Conjugate gaze: absent    CN V   Right facial sensation deficit: none  Left facial sensation deficit: none    CN VII   Right facial weakness: none  Left facial weakness: none    CN VIII   Hearing: intact    CN IX, X   CN IX normal.   CN X normal.     CN XI   Right sternocleidomastoid strength: normal  Left sternocleidomastoid strength: normal  Right trapezius strength: normal  Left trapezius strength: normal    CN XII "   Fasciculations: absent  Tongue deviation: none  Internuclear ophthalmoplegia     Motor Exam   Muscle bulk: normal  Overall muscle tone: normal  Right arm pronator drift: absent  Left arm pronator drift: absentDeconditioning and 4+/5 gen. motor weakness     Sensory Exam   Light touch normal.   Vibration normal.   Pinprick normal.     Gait, Coordination, and Reflexes     Gait  Gait: wide-based    Coordination   Romberg: negative  Finger to nose coordination: normal  Heel to shin coordination: abnormal  Tandem walking coordination: abnormal    Tremor   Resting tremor: absent  Intention tremor: absent  Action tremor: absent    Reflexes   Right brachioradialis: 1+  Left brachioradialis: 1+  Right biceps: 1+  Left biceps: 1+  Right triceps: 1+  Left triceps: 1+  Right patellar: 1+  Left patellar: 1+  Right achilles: 1+  Left achilles: 1+  Right plantar: normal  Left plantar: normal  Right Naranjo: absent  Left Naranjo: absent  Right ankle clonus: absent  Left ankle clonus: absent      Physical Exam   Constitutional: She is oriented to person, place, and time. She appears well-developed and well-nourished.   HENT:   Head: Normocephalic and atraumatic.   Eyes: EOM are normal. Pupils are equal, round, and reactive to light.   Neck: Normal range of motion. Neck supple.   Cardiovascular: Normal rate and intact distal pulses.    Pulmonary/Chest: Effort normal. No respiratory distress.   Abdominal: Soft. She exhibits no distension.   Musculoskeletal: Normal range of motion. She exhibits no edema or deformity.   Neurological: She is oriented to person, place, and time. She has an abnormal Heel to Shin Test and an abnormal Tandem Gait Test. She has a normal Finger-Nose-Finger Test and a normal Romberg Test.   Reflex Scores:       Tricep reflexes are 1+ on the right side and 1+ on the left side.       Bicep reflexes are 1+ on the right side and 1+ on the left side.       Brachioradialis reflexes are 1+ on the right side and 1+ on  the left side.       Patellar reflexes are 1+ on the right side and 1+ on the left side.       Achilles reflexes are 1+ on the right side and 1+ on the left side.  Skin: Skin is warm and dry.   Psychiatric: She has a normal mood and affect. Her speech is normal and behavior is normal. Judgment and thought content normal.   Nursing note and vitals reviewed.      Provider dictation:  Mrs. Reece is a 67-year-old   female metastatic high grade intraductal breast carcinoma who is now s/p laminectomy and partial resection of intramedullary tumors. 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  A May 2017 PET scan and lumbar MRI revealed new lumbar lesion that was removed 6-12-17 through an L1-:3 laminectomy.  She recovered well from surgery and the incision is healed    The post-op MRI shows continued growth of the inferior lesion and I have advised her to start radiation therapy as soon as possible.    Further lumbar surgery would worse her neurologic deficits, so it is imperative to obtain control of the metastatic spread with chemo or radiotherapy.    Visit Diagnosis:  Brainstem tumor  -     MRI Brain W WO Contrast; Future; Expected date: 07/19/2017    Intradural extramedullary spinal tumor  -     MRI Lumbar Spine W WO Contrast; Future; Expected date: 07/19/2017    Back pain, unspecified back location, unspecified back pain laterality, unspecified chronicity  -     Ambulatory Referral to Pain Clinic    Low back pain associated with a spinal disorder other than radiculopathy or spinal stenosis    Leg pain, bilateral    Spinal cord compression due to malignant neoplasm metastatic to  spine

## 2017-08-09 RX ORDER — SODIUM CHLORIDE 0.9 % (FLUSH) 0.9 %
10 SYRINGE (ML) INJECTION
Status: CANCELLED | OUTPATIENT
Start: 2017-08-15

## 2017-08-09 RX ORDER — HEPARIN 100 UNIT/ML
500 SYRINGE INTRAVENOUS
Status: CANCELLED | OUTPATIENT
Start: 2017-08-15

## 2017-08-10 DIAGNOSIS — F32.0 MILD SINGLE CURRENT EPISODE OF MAJOR DEPRESSIVE DISORDER: Primary | ICD-10-CM

## 2017-08-10 RX ORDER — DULOXETIN HYDROCHLORIDE 20 MG/1
20 CAPSULE, DELAYED RELEASE ORAL DAILY
Qty: 90 CAPSULE | Refills: 3 | Status: SHIPPED | OUTPATIENT
Start: 2017-08-10

## 2017-08-10 RX ORDER — DULOXETIN HYDROCHLORIDE 20 MG/1
20 CAPSULE, DELAYED RELEASE ORAL DAILY
Qty: 90 CAPSULE | Refills: 3 | Status: SHIPPED | OUTPATIENT
Start: 2017-08-10 | End: 2017-08-10 | Stop reason: SDUPTHER

## 2017-08-11 ENCOUNTER — OFFICE VISIT (OUTPATIENT)
Dept: PAIN MEDICINE | Facility: CLINIC | Age: 67
End: 2017-08-11
Payer: MEDICARE

## 2017-08-11 VITALS
HEART RATE: 88 BPM | HEIGHT: 65 IN | TEMPERATURE: 98 F | DIASTOLIC BLOOD PRESSURE: 60 MMHG | BODY MASS INDEX: 31.32 KG/M2 | SYSTOLIC BLOOD PRESSURE: 98 MMHG | WEIGHT: 188 LBS

## 2017-08-11 DIAGNOSIS — G89.3 CANCER RELATED PAIN: ICD-10-CM

## 2017-08-11 DIAGNOSIS — C50.919 CARCINOMA OF BREAST METASTATIC TO BRAIN, UNSPECIFIED LATERALITY: ICD-10-CM

## 2017-08-11 DIAGNOSIS — M54.16 RIGHT LUMBAR RADICULOPATHY: ICD-10-CM

## 2017-08-11 DIAGNOSIS — C79.31 CARCINOMA OF BREAST METASTATIC TO BRAIN, UNSPECIFIED LATERALITY: ICD-10-CM

## 2017-08-11 DIAGNOSIS — D49.7 INTRADURAL EXTRAMEDULLARY SPINAL TUMOR: Primary | ICD-10-CM

## 2017-08-11 PROCEDURE — 99999 PR PBB SHADOW E&M-EST. PATIENT-LVL IV: CPT | Mod: PBBFAC,,, | Performed by: ANESTHESIOLOGY

## 2017-08-11 PROCEDURE — 1159F MED LIST DOCD IN RCRD: CPT | Mod: S$GLB,,, | Performed by: ANESTHESIOLOGY

## 2017-08-11 PROCEDURE — 1125F AMNT PAIN NOTED PAIN PRSNT: CPT | Mod: S$GLB,,, | Performed by: ANESTHESIOLOGY

## 2017-08-11 PROCEDURE — 3078F DIAST BP <80 MM HG: CPT | Mod: S$GLB,,, | Performed by: ANESTHESIOLOGY

## 2017-08-11 PROCEDURE — 99205 OFFICE O/P NEW HI 60 MIN: CPT | Mod: S$GLB,,, | Performed by: ANESTHESIOLOGY

## 2017-08-11 PROCEDURE — 3074F SYST BP LT 130 MM HG: CPT | Mod: S$GLB,,, | Performed by: ANESTHESIOLOGY

## 2017-08-11 RX ORDER — PREGABALIN 75 MG/1
75 CAPSULE ORAL EVERY 8 HOURS PRN
Refills: 3 | COMMUNITY
Start: 2017-07-19

## 2017-08-11 RX ORDER — OXYCODONE AND ACETAMINOPHEN 10; 325 MG/1; MG/1
1 TABLET ORAL 3 TIMES DAILY PRN
Qty: 60 TABLET | Refills: 0 | Status: SHIPPED | OUTPATIENT
Start: 2017-08-11

## 2017-08-11 RX ORDER — FENTANYL 25 UG/1
1 PATCH TRANSDERMAL
COMMUNITY
Start: 2017-08-10 | End: 2017-09-05

## 2017-08-11 NOTE — PROGRESS NOTES
This note was completed with dictation software and grammatical errors may exist.    CC: Back pain, right leg pain, nausea    HPI: Mrs. Reece is a 67-year-old woman with a history of metastatic high grade intraductal breast carcinoma who is now s/p laminectomy and partial resection of intramedullary tumors by Dr. Wong, referred for pain control. She has a history of metastatic breast CA with intracranial mets and  shunt placement (11/27/16), suboccipital craniotomy and supracerebellar approach for resection of a pontine brainstem metastatic lesion (11/29/16) and status post gamma knife radiosurgery for recurrent metastatic lesions in the brainstem and left cerebellum due to metastatic high grade intraductal breast carcinoma.  A May 2017 PET scan and lumbar MRI revealed new lumbar lesion that was then removed 6-12-17 through an L1-3 laminectomy by Dr. Wong.  An MRI was obtained on 7/19/17 which then demonstrates continued growth of the spinal canal masses at L1 and L3, especially the L3 lesion.  She recently underwent 15 rounds of radiation treatment at [facility] with Dr. Brock Burkett in [place].  She had been on chemotherapy prior to her laminectomy procedure and she is getting set up again to resume this with Dr. Leanna Bangura.     Her pain is worst in the midline low back and she describes it as dull, throbbing, sharp.  It is worse with just about any activity especially prolonged sitting or standing, walking, bending or flexing or coughing.  She does get some relief with rest, laying down, physical therapy, cold and medications.    Pain intervention history: For pain she has been taking Percocet 7.5/325 up to twice a day without much relief.  She was recently placed on OxyContin 20 mg twice daily and she reports no relief with that.  She was then started on fentanyl patch at 12 µg/hour and recently increased to 25 µg/hour and she does feel some benefit with this.  She is not sure if she is getting side  effects with this, she does have intermittent nausea, fatigue.  She takes Compazine every 6 hours as needed for nausea.  She tried Zofran in the past but this causes tongue swelling secondary to the aspartame.  She is taking Lyrica 75 mg 3 times daily, unclear relief.      ROS: She reports chills, fatigability, weight loss, vision change, hoarse voice, swollen glands, cough, appetite change, constipation, stomach pain, nausea and vomiting, easy bruising, urinary urgency and frequency, back pain, memory loss, difficulty sleeping, dizziness, anxiety and loss of balance.  Balance of review of systems is negative.    Past Medical History:   Diagnosis Date    Arthritis     Brainstem tumor     Breast lesion     LEFT    Cancer     breast    HTN (hypertension)     Malignant neoplasm of nipple of right breast     Murmur     Osteopenia     Presence of dental bridge     LOWER    Seasonal allergies     Thyroid disease     Hypothyroidism    Wears glasses        Past Surgical History:   Procedure Laterality Date    BREAST SURGERY Left     biopsy, and removal of papiloma    CHOLECYSTECTOMY  4/7/12    COLONOSCOPY  6/2012    2    CRANIOTOMY      CSF SHUNT      HYSTERECTOMY  1992    TVH BSO    mass taken off of back      benign    MASTECTOMY      right side with 4 lymph nodes removed    PORTACATH PLACEMENT Left        Social History     Social History    Marital status:      Spouse name: N/A    Number of children: N/A    Years of education: N/A     Social History Main Topics    Smoking status: Never Smoker    Smokeless tobacco: Never Used    Alcohol use No    Drug use: No    Sexual activity: Yes     Partners: Male     Birth control/ protection: Post-menopausal, Surgical     Other Topics Concern    None     Social History Narrative    None         Medications/Allergies: See med card    Vitals:    08/11/17 1059   BP: 98/60   Pulse: 88   Temp: 98.3 °F (36.8 °C)   TempSrc: Oral   Weight: 85.3 kg (188  "lb)   Height: 5' 5" (1.651 m)   PainSc:   4   PainLoc: Back         Physical exam:  Gen: A and O x3, pleasant, well-groomed  Skin: No rashes or obvious lesions, alopecia   HEENT: Left eye ptosis, no obvious deformities on ears or in canals. Trachea midline.  CVS: Regular rate and rhythm, normal palpable pulses.  Resp:No increased work of breathing, symmetrical chest rise.  Abdomen: Soft, NT/ND.  Musculoskeletal: Slow, cautious wide-based gait     Neuro:  Lower extremities: 5/5 strength left side, 4/5 right side  Reflexes: Patellar 1+, Achilles 0+ bilaterally.  Sensory:  Intact and symmetrical to light touch and pinprick in L2-S1 dermatomes bilaterally, some decreased sensation throughout the right leg..    Lumbar spine:  Lumbar spine: Range of motion moderately reduced with flexion and extension secondary to increased pain in all directions.    Eulogio's test deferred..    Supine straight leg raise is negative bilaterally.    Internal and external rotation of the hip causes no increased pain on either side.  Myofascial exam: No tenderness to palpation across lumbar paraspinous muscles.    Imagin17 MRI L-spine  The surgical changes are noted since the prior examination with laminectomies have been L1, L2, and L3 levels. The spinal cord appears to terminate at the L1 level. No osseous signal abnormality is noted to suggest an aggressive osseous process.  At the L1-L2 level a heterogeneously enhancing mass is noted larger than the mass seen on the prior of 2.7 x 1.4 x 1.5 cm. This displaces the conus into the posterior lateral thecal sac on the left with  compression of the cord within the thecal sac to approximately 5 mm x 12 mm. This mass appears intrathecal but is favored to be extraparenchymal  In the expected location of the mass on the prior posterior to the L3 vertebral body a heterogeneously enhancing lesion is noted of 2.5 x 1.3 x 0.9 cm. This is larger than on the prior and appears to be intrathecal " taking up the majority of the space within the thecal sac with the space and outside of this mass in the thecal sac narrowed to 5 mm predominantly anterior to the left of the mass  No abnormal fluid collections are noted in the postsurgical bed .  No new/distant lesions are noted.  Vertebral body heights appear well-preserved. Vertebral body alignment appears adequate.  At the T12-L1 normal,  no significant disk bulge, central canal stenosis, or neural foraminal stenosis is noted  At L1-L2 level the majority of the thecal sac is filled with the mass. Mild uncovertebral spurring to the left greater than right is noted. No significant neuroforaminal narrowing is noted. The disc bulge does not appear to be causing significant thecal sac narrowing  At L2-L3 level, broad-based bulge is noted towards the left neural foramen of 3 mm, mild left neural foraminal narrowing is noted. No significant right-sided neural foraminal narrowing is noted, no significant disc bulge into the thecal sac is noted  At L3-L4 level, minimal disc bulge noted towards each neural foramen, minimal bilateral neuroforaminal narrowing is noted. No significant thecal sac narrowing secondary to disc bulge is noted. A mass takes up the majority of the thecal sac  At the L4-L5 level, mild broad-based bulge is noted towards each neural foramen with mild bilateral neuroforamina narrowing. Minimal central canal narrowing is noted.  At L5-S1 level, mild broad-based bulge is noted of 3 mm, no significant neuroforaminal narrowing is noted. Minimal central canal narrowing is noted.    Assessment:  Mrs. Reece is a 67-year-old woman with a history of metastatic high grade intraductal breast carcinoma who is now s/p laminectomy and partial resection of intramedullary tumors by Dr. Wong, referred for pain control.  1. Intradural extramedullary spinal tumor     2. Carcinoma of breast metastatic to brain, unspecified laterality     3. Right lumbar radiculopathy      4. Cancer related pain           Plan:  1.  We discussed that since she just increased from 12-25 µg/hour fentanyl patch, we will have her continue this, has not had any adverse side effects as of yet and does feel that this may be helping.  I will also give her a slightly stronger Percocet to take for breakthrough pain at 10/325 up to 3 times daily.  She will let me know in the next week or so if this is beneficial or not.  I did curvature to continue with the Lyrica for the right leg radicular symptoms, we may consider increasing this at some point.  2.  I will have her set up for a follow-up appointment in 1 month but told her to contact me in the meantime for any pain related issues.    Thank you for referring this interesting patient, and I look forward to continuing to collaborate in her care.

## 2017-08-11 NOTE — LETTER
August 13, 2017      Nguyễn Wong MD  [address] [facility] [address]  [address]  [facility] [address]           [facility]  [address] [facility] [address] [place] [address]  Phone: [phone]          Patient: Rosaura David   MR Number: [id number]   YOB: 1950   Date of Visit: 8/11/2017       Dear Dr. Nguyễn Wong:    Thank you for referring Rosaura David to me for evaluation. Attached you will find relevant portions of my assessment and plan of care.    If you have questions, please do not hesitate to call me. I look forward to following Rosaura David along with you.    Sincerely,    Shawn Hernandez MD    Enclosure  CC:  No Recipients    If you would like to receive this communication electronically, please contact [email or web address] or [phone] to request more information on [employer] Link access.    For providers and/or their staff who would like to refer a patient to [facility], please contact us through our one-stop-shop provider referral line, [facility], at [phone].    If you feel you have received this communication in error or would no longer like to receive these types of communications, please e-mail [email or web address]

## 2017-08-14 ENCOUNTER — PATIENT MESSAGE (OUTPATIENT)
Dept: HEMATOLOGY/ONCOLOGY | Facility: CLINIC | Age: 67
End: 2017-08-14

## 2017-08-15 ENCOUNTER — INFUSION (OUTPATIENT)
Dept: INFUSION THERAPY | Facility: HOSPITAL | Age: 67
End: 2017-08-15
Attending: INTERNAL MEDICINE
Payer: MEDICARE

## 2017-08-15 DIAGNOSIS — C50.011 MALIGNANT NEOPLASM INVOLVING BOTH NIPPLE AND AREOLA OF RIGHT BREAST IN FEMALE, UNSPECIFIED ESTROGEN RECEPTOR STATUS: Primary | ICD-10-CM

## 2017-08-15 PROCEDURE — A4216 STERILE WATER/SALINE, 10 ML: HCPCS | Mod: PN | Performed by: INTERNAL MEDICINE

## 2017-08-15 PROCEDURE — 25000003 PHARM REV CODE 250: Mod: PN | Performed by: INTERNAL MEDICINE

## 2017-08-15 PROCEDURE — 63600175 PHARM REV CODE 636 W HCPCS: Mod: PN | Performed by: INTERNAL MEDICINE

## 2017-08-15 PROCEDURE — 96523 IRRIG DRUG DELIVERY DEVICE: CPT | Mod: PN

## 2017-08-15 RX ORDER — HEPARIN 100 UNIT/ML
500 SYRINGE INTRAVENOUS
Status: COMPLETED | OUTPATIENT
Start: 2017-08-15 | End: 2017-08-15

## 2017-08-15 RX ORDER — SODIUM CHLORIDE 0.9 % (FLUSH) 0.9 %
10 SYRINGE (ML) INJECTION
Status: COMPLETED | OUTPATIENT
Start: 2017-08-15 | End: 2017-08-15

## 2017-08-15 RX ORDER — SODIUM CHLORIDE 0.9 % (FLUSH) 0.9 %
10 SYRINGE (ML) INJECTION
Status: CANCELLED | OUTPATIENT
Start: 2017-08-15

## 2017-08-15 RX ORDER — HEPARIN 100 UNIT/ML
500 SYRINGE INTRAVENOUS
Status: CANCELLED | OUTPATIENT
Start: 2017-08-15

## 2017-08-15 RX ADMIN — SODIUM CHLORIDE, PRESERVATIVE FREE 10 ML: 5 INJECTION INTRAVENOUS at 03:08

## 2017-08-15 RX ADMIN — SODIUM CHLORIDE, PRESERVATIVE FREE 500 UNITS: 5 INJECTION INTRAVENOUS at 03:08

## 2017-08-15 NOTE — NURSING
"Hand hygiene performed before patient contact and Verified the correct patient using two identifiers. Assisted patient to a comfortable position and instruct the patient to turn his or her head away from the port during the procedure.  Left Port accessed using aseptic technique- using Nj needle 20g  1 "-  Negative blood return noted, positional changes and deep breathing and coughing without blood return. Port flushes without noted resistance, swelling, leakage. Pt denies any pain at site- flushed with 10ccNS and 5cc/100u/ml Hep flush per orders.   Port deaccessed gauze and paper tape applied. Patient tolerated procedure without noted or reported distress. Pt discharge from unit via WC accompanied by . Discussed with patient planning on TPA at next scheduled port flush. Listed in next port flush appointment notes.  "

## 2017-08-17 ENCOUNTER — TELEPHONE (OUTPATIENT)
Dept: HEMATOLOGY/ONCOLOGY | Facility: CLINIC | Age: 67
End: 2017-08-17

## 2017-08-17 ENCOUNTER — PATIENT MESSAGE (OUTPATIENT)
Dept: PAIN MEDICINE | Facility: CLINIC | Age: 67
End: 2017-08-17

## 2017-08-17 NOTE — TELEPHONE ENCOUNTER
Records faxed to Dr. Claire at .  Imaging on CD requested from Jolie Santos. She will let me know when cd is ready to be picked up so that I may inform the patient

## 2017-08-18 ENCOUNTER — PATIENT MESSAGE (OUTPATIENT)
Dept: HEMATOLOGY/ONCOLOGY | Facility: CLINIC | Age: 67
End: 2017-08-18

## 2017-08-26 ENCOUNTER — PATIENT MESSAGE (OUTPATIENT)
Dept: HEMATOLOGY/ONCOLOGY | Facility: CLINIC | Age: 67
End: 2017-08-26

## 2017-08-29 ENCOUNTER — TELEPHONE (OUTPATIENT)
Dept: HEMATOLOGY/ONCOLOGY | Facility: CLINIC | Age: 67
End: 2017-08-29

## 2017-08-29 NOTE — TELEPHONE ENCOUNTER
----- Message from Kristina Villagran sent at 8/29/2017  9:43 AM CDT -----  Contact:   Ajith,  [phone], Requesting to speak with the doctor only. Please advise. Regarding second opinion. Thanks.

## 2017-09-01 ENCOUNTER — OFFICE VISIT (OUTPATIENT)
Dept: HEMATOLOGY/ONCOLOGY | Facility: CLINIC | Age: 67
End: 2017-09-01
Payer: MEDICARE

## 2017-09-01 ENCOUNTER — LAB VISIT (OUTPATIENT)
Dept: LAB | Facility: HOSPITAL | Age: 67
End: 2017-09-01
Attending: INTERNAL MEDICINE
Payer: MEDICARE

## 2017-09-01 VITALS
HEART RATE: 107 BPM | DIASTOLIC BLOOD PRESSURE: 73 MMHG | WEIGHT: 175 LBS | SYSTOLIC BLOOD PRESSURE: 114 MMHG | RESPIRATION RATE: 16 BRPM | BODY MASS INDEX: 29.16 KG/M2 | TEMPERATURE: 99 F | HEIGHT: 65 IN

## 2017-09-01 DIAGNOSIS — Z98.890 S/P CRANIOTOMY: ICD-10-CM

## 2017-09-01 DIAGNOSIS — C50.011 MALIGNANT NEOPLASM OF NIPPLE OF RIGHT BREAST IN FEMALE, UNSPECIFIED ESTROGEN RECEPTOR STATUS: ICD-10-CM

## 2017-09-01 DIAGNOSIS — R49.9 VOICE DISTURBANCE: ICD-10-CM

## 2017-09-01 DIAGNOSIS — Z98.2 S/P VP SHUNT: ICD-10-CM

## 2017-09-01 DIAGNOSIS — C50.011 MALIGNANT NEOPLASM OF NIPPLE OF RIGHT BREAST IN FEMALE, UNSPECIFIED ESTROGEN RECEPTOR STATUS: Primary | ICD-10-CM

## 2017-09-01 LAB
ALBUMIN SERPL BCP-MCNC: 4.3 G/DL
ALP SERPL-CCNC: 60 U/L
ALT SERPL W/O P-5'-P-CCNC: 52 U/L
ANION GAP SERPL CALC-SCNC: 9 MMOL/L
AST SERPL-CCNC: 28 U/L
BASOPHILS # BLD AUTO: 0.01 K/UL
BASOPHILS NFR BLD: 0.3 %
BILIRUB SERPL-MCNC: 0.9 MG/DL
BUN SERPL-MCNC: 11 MG/DL
CALCIUM SERPL-MCNC: 10.4 MG/DL
CHLORIDE SERPL-SCNC: 101 MMOL/L
CO2 SERPL-SCNC: 30 MMOL/L
CREAT SERPL-MCNC: 0.74 MG/DL
DIFFERENTIAL METHOD: ABNORMAL
EOSINOPHIL # BLD AUTO: 0.1 K/UL
EOSINOPHIL NFR BLD: 1.5 %
ERYTHROCYTE [DISTWIDTH] IN BLOOD BY AUTOMATED COUNT: 14 %
EST. GFR  (AFRICAN AMERICAN): >60 ML/MIN/1.73 M^2
EST. GFR  (NON AFRICAN AMERICAN): >60 ML/MIN/1.73 M^2
GLUCOSE SERPL-MCNC: 130 MG/DL
HCT VFR BLD AUTO: 35.9 %
HGB BLD-MCNC: 12.7 G/DL
LYMPHOCYTES # BLD AUTO: 0.6 K/UL
LYMPHOCYTES NFR BLD: 17.5 %
MCH RBC QN AUTO: 29.4 PG
MCHC RBC AUTO-ENTMCNC: 35.4 G/DL
MCV RBC AUTO: 83 FL
MONOCYTES # BLD AUTO: 0.6 K/UL
MONOCYTES NFR BLD: 18.7 %
NEUTROPHILS # BLD AUTO: 2.1 K/UL
NEUTROPHILS NFR BLD: 62 %
NRBC BLD-RTO: 0 /100 WBC
PLATELET # BLD AUTO: 208 K/UL
PMV BLD AUTO: 9.7 FL
POTASSIUM SERPL-SCNC: 3.8 MMOL/L
PROT SERPL-MCNC: 7.3 G/DL
RBC # BLD AUTO: 4.32 M/UL
SODIUM SERPL-SCNC: 140 MMOL/L
WBC # BLD AUTO: 3.37 K/UL

## 2017-09-01 PROCEDURE — 3008F BODY MASS INDEX DOCD: CPT | Mod: S$GLB,,, | Performed by: INTERNAL MEDICINE

## 2017-09-01 PROCEDURE — 1159F MED LIST DOCD IN RCRD: CPT | Mod: S$GLB,,, | Performed by: INTERNAL MEDICINE

## 2017-09-01 PROCEDURE — 1125F AMNT PAIN NOTED PAIN PRSNT: CPT | Mod: S$GLB,,, | Performed by: INTERNAL MEDICINE

## 2017-09-01 PROCEDURE — 80053 COMPREHEN METABOLIC PANEL: CPT

## 2017-09-01 PROCEDURE — 86300 IMMUNOASSAY TUMOR CA 15-3: CPT

## 2017-09-01 PROCEDURE — 80053 COMPREHEN METABOLIC PANEL: CPT | Mod: PN

## 2017-09-01 PROCEDURE — 36415 COLL VENOUS BLD VENIPUNCTURE: CPT | Mod: PN

## 2017-09-01 PROCEDURE — 3074F SYST BP LT 130 MM HG: CPT | Mod: S$GLB,,, | Performed by: INTERNAL MEDICINE

## 2017-09-01 PROCEDURE — 99999 PR PBB SHADOW E&M-EST. PATIENT-LVL III: CPT | Mod: PBBFAC,,, | Performed by: INTERNAL MEDICINE

## 2017-09-01 PROCEDURE — 3078F DIAST BP <80 MM HG: CPT | Mod: S$GLB,,, | Performed by: INTERNAL MEDICINE

## 2017-09-01 PROCEDURE — 85025 COMPLETE CBC W/AUTO DIFF WBC: CPT

## 2017-09-01 PROCEDURE — 85025 COMPLETE CBC W/AUTO DIFF WBC: CPT | Mod: PN

## 2017-09-01 PROCEDURE — 99215 OFFICE O/P EST HI 40 MIN: CPT | Mod: S$GLB,,, | Performed by: INTERNAL MEDICINE

## 2017-09-01 RX ORDER — MORPHINE SULFATE 15 MG/1
15 TABLET, FILM COATED, EXTENDED RELEASE ORAL EVERY 8 HOURS PRN
Qty: 20 TABLET | Refills: 0 | Status: SHIPPED | OUTPATIENT
Start: 2017-09-01

## 2017-09-01 RX ORDER — LORAZEPAM 1 MG/1
1 TABLET ORAL 2 TIMES DAILY
Qty: 60 TABLET | Refills: 0 | Status: SHIPPED | OUTPATIENT
Start: 2017-09-01 | End: 2018-09-01

## 2017-09-01 RX ORDER — OLANZAPINE 5 MG/1
5 TABLET ORAL NIGHTLY
Qty: 30 TABLET | Refills: 2 | Status: SHIPPED | OUTPATIENT
Start: 2017-09-01 | End: 2018-09-01

## 2017-09-01 NOTE — PROGRESS NOTES
A 67-year-old  woman known to me.  The patient was diagnosed in   February 2015 with a right breast mass showing poorly differentiated triple   negative invasive ductal carcinoma.  It was locally advanced lymph node positive   and she was given neoadjuvant chemotherapy followed by partial mastectomy.  The   patient refused radiation therapy.  She had tumor recurrence locally and   received neoadjuvant chemotherapy followed by mastectomy with same AC   combination at that time.  She then progressed with brain metastases in 2016,   underwent tumor resection and gamma knife radiation therapy.  The patient also   had progression with recurrent brain and spinal metastases and then recently   completed radiation therapy on 08/17/2017.  The patient treatment history   includes AC x4 and Taxotere x4.  The patient has some neuropathy at completion.    In 09/20/2015, four sentinel lymph nodes were negative, had a partial   mastectomy that was negative.  In October 2015, the patient had recurrence with   excessive hypermetabolic lymphadenopathy in the right axilla, right subpectoral   region.  She was treated again with Adriamycin and Cytoxan followed by   mastectomy.  Other history included thyroid colloid goiter.  The patient had PET   scans done around March 2015, hypermetabolic foci that were multiple with right   axillary, right subpectoral, right subclavian, right supraclavicular   hypermetabolic nodes and also near the trachea/brachiocephalic vein on the   right.  The patient comes now for ongoing treatment.  She is very, very   fatigued, has not been able to ambulate since the most recent brain surgery with   the right leg being extremely weak.  She also had vocal cord paralysis and   ptosis since the initial surgery with the  shunt placement.  Most recent scans   and the MRI was June 2017, she had interval enlargement of 1 of 2 previously   described posterior foci neural based masses with interval  appearance of a new   right cerebellopontine angle cistern mass.  She has seen Dr. Wong multiple times   in surgery and her most recent visit with him was July 31st.  Her most recent   procedure was a gamma knife.  The patient also had laminectomy with partial   resection of intramedullary tumors then a suboccipital craniotomy and a   supracerebellar approach for resection of pontine brain stem metastatic lesion   in November 2016 and then she had gamma knife radiosurgery for recurrent   metastatic lesions in the brain stem and left cerebellum.  She also had a lumbar   lesion in May 2017 PET scan that was removed June 2017 along with having an L1   to L3 laminectomy.  The patient had shown continued growth on the MRI and has   most recently completed radiation therapy to whole brain.    PHYSICAL EXAM:  The patient has ptosis.  Footdrop on the right side, weak, but   has been having a good appetite.  They would like to continue with systemic   chemotherapy as soon as possible.  She is here with a very supportive .    In the past, Xeloda had been discussed, but the income did not allow the patient   to have assistance and therefore, we rule out use of Xeloda.  The patient's   lungs are clear to auscultation with rales, rhonchi, crepitations negative.    Abdomen soft without rebound, guarding or rigidity.  Extremities showing no   edema.    The patient is triple negative again on the most recent pathology as well.    IMPRESSION:  Aggressive high-grade breast carcinoma with multiple spinal and   brain metastases.    PLAN:  1.  Since Xeloda is expensive for family, we will consider use of carboplatin   AUC 6 and gemcitabine 1000 mg/m2 day one and eight of a 21-day cycle with   Neulasta support.  Discussed this with the patient, we will do chairside   teaching.  2.  We will need a PET scan ASAP for staging for now.  3.  CBC, CMP, tumor markers today.  Start this round of chemotherapy as quickly   as possible.  The  patient has a port in place already.  I will see her back in   clinic with CBC and CMP for day eight of gemcitabine.  The patient to continue   follow up with Dr. Wong, neurosurgeon.      SSS/PN  dd: 09/01/2017 15:56:32 (CDT)  td: 09/01/2017 16:51:05 (CDT)  Doc ID   #[id number]  Job ID #[id number]    CC:

## 2017-09-05 ENCOUNTER — PATIENT MESSAGE (OUTPATIENT)
Dept: HEMATOLOGY/ONCOLOGY | Facility: CLINIC | Age: 67
End: 2017-09-05

## 2017-09-06 LAB — CANCER AG27-29 SERPL-ACNC: 20.3 U/ML

## 2017-09-07 ENCOUNTER — TELEPHONE (OUTPATIENT)
Dept: HEMATOLOGY/ONCOLOGY | Facility: CLINIC | Age: 67
End: 2017-09-07

## 2017-09-08 ENCOUNTER — TELEPHONE (OUTPATIENT)
Dept: HEMATOLOGY/ONCOLOGY | Facility: CLINIC | Age: 67
End: 2017-09-08

## 2017-09-08 ENCOUNTER — TELEPHONE (OUTPATIENT)
Dept: PHARMACY | Facility: AMBULARY SURGERY CENTER | Age: 67
End: 2017-09-08

## 2017-09-08 NOTE — TELEPHONE ENCOUNTER
----- Message from Pricilla Rob sent at 9/8/2017  9:37 AM CDT -----  Contact: Carmencita with [employer]  Carmencita with [employer] has been calling regarding a prior authorization since 09/06/17 with no response. Please call Carmencita with additional information. Carmencita states she needs a response by 4:00 pm today to make a decision on the request. Please fax to [phone]. Please call Carmencita at [phone] ext [phone]. Thanks!

## 2017-09-08 NOTE — TELEPHONE ENCOUNTER
Gemcitabine 1000mg/m2 Days 1 and 8 ; Carboplatin Auc 5 every 21 days. Using AUC 5 not using Xeloda.

## 2017-09-11 ENCOUNTER — PATIENT MESSAGE (OUTPATIENT)
Dept: HEMATOLOGY/ONCOLOGY | Facility: CLINIC | Age: 67
End: 2017-09-11

## 2017-09-11 ENCOUNTER — TELEPHONE (OUTPATIENT)
Dept: INFUSION THERAPY | Facility: HOSPITAL | Age: 67
End: 2017-09-11

## 2017-09-12 ENCOUNTER — DOCUMENTATION ONLY (OUTPATIENT)
Dept: HEMATOLOGY/ONCOLOGY | Facility: CLINIC | Age: 67
End: 2017-09-12

## 2017-09-12 NOTE — PROGRESS NOTES
Infusion appointment on 9/13/17 cancelled. Dr. Bangura stated patient is going to go see Dr. Hernandez due to transportation issues.

## 2017-09-14 ENCOUNTER — TELEPHONE (OUTPATIENT)
Dept: HEMATOLOGY/ONCOLOGY | Facility: CLINIC | Age: 67
End: 2017-09-14

## 2017-09-14 NOTE — TELEPHONE ENCOUNTER
----- Message from Ivonne Quinn LPN sent at 9/14/2017  2:53 PM CDT -----  Contact: Alisa with [employer] [facility] at [phone]  Patient does not see Dr. Baires, looks like Dr. Bangura order PET scan      GRACIA Coronado      ----- Message -----  From: Kristina Villagran  Sent: 9/14/2017  10:23 AM  To: Skyla Tena Staff    Alisa with [employer] [facility] at [phone] for Members Services. Calling about authorization # [id number] for PET SCAN. Please advise. Thanks.

## 2017-10-09 ENCOUNTER — PATIENT MESSAGE (OUTPATIENT)
Dept: NEUROSURGERY | Facility: CLINIC | Age: 67
End: 2017-10-09

## 2017-10-13 ENCOUNTER — PATIENT MESSAGE (OUTPATIENT)
Dept: HEMATOLOGY/ONCOLOGY | Facility: CLINIC | Age: 67
End: 2017-10-13

## 2017-12-14 ENCOUNTER — PATIENT MESSAGE (OUTPATIENT)
Dept: HEMATOLOGY/ONCOLOGY | Facility: CLINIC | Age: 67
End: 2017-12-14